# Patient Record
Sex: FEMALE | Race: OTHER | Employment: UNEMPLOYED | URBAN - METROPOLITAN AREA
[De-identification: names, ages, dates, MRNs, and addresses within clinical notes are randomized per-mention and may not be internally consistent; named-entity substitution may affect disease eponyms.]

---

## 2017-06-08 ENCOUNTER — HOSPITAL ENCOUNTER (OUTPATIENT)
Dept: CARDIOLOGY | Age: 47
Discharge: HOME | End: 2017-06-08
Attending: NURSE PRACTITIONER | Admitting: NURSE PRACTITIONER

## 2017-06-10 LAB
EKG ATRIAL RATE: 73 BPM
EKG DIAGNOSIS: NORMAL
EKG P AXIS: 61 DEGREES
EKG P-R INTERVAL: 144 MS
EKG Q-T INTERVAL: 392 MS
EKG QRS DURATION: 90 MS
EKG QTC CALCULATION (BAZETT): 431 MS
EKG R AXIS: 66 DEGREES
EKG T AXIS: 62 DEGREES
EKG VENTRICULAR RATE: 73 BPM

## 2017-06-19 ENCOUNTER — HOSPITAL ENCOUNTER (OUTPATIENT)
Dept: WOMENS IMAGING | Age: 47
Discharge: HOME | End: 2017-06-19
Attending: NURSE PRACTITIONER | Admitting: NURSE PRACTITIONER

## 2017-06-19 DIAGNOSIS — M25.512 LEFT SHOULDER PAIN, UNSPECIFIED CHRONICITY: ICD-10-CM

## 2017-06-19 DIAGNOSIS — Z12.31 VISIT FOR SCREENING MAMMOGRAM: ICD-10-CM

## 2017-10-06 ENCOUNTER — HOSPITAL ENCOUNTER (OUTPATIENT)
Dept: MAMMOGRAPHY | Age: 47
Discharge: HOME | End: 2017-11-04

## 2017-12-18 ENCOUNTER — HOSPITAL ENCOUNTER (EMERGENCY)
Age: 47
Discharge: HOME | End: 2017-12-18
Attending: NURSE PRACTITIONER
Payer: MEDICARE

## 2017-12-18 VITALS
SYSTOLIC BLOOD PRESSURE: 146 MMHG | HEART RATE: 85 BPM | WEIGHT: 150 LBS | TEMPERATURE: 97.8 F | DIASTOLIC BLOOD PRESSURE: 75 MMHG | OXYGEN SATURATION: 98 % | BODY MASS INDEX: 27.6 KG/M2 | RESPIRATION RATE: 16 BRPM | HEIGHT: 62 IN
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
## 2017-12-18 PROCEDURE — 99202 OFFICE O/P NEW SF 15 MIN: CPT | Performed by: NURSE PRACTITIONER

## 2017-12-18 PROCEDURE — 99212 OFFICE O/P EST SF 10 MIN: CPT

## 2017-12-18 RX ORDER — LORATADINE 10 MG/1
10 TABLET ORAL DAILY
Refills: 0 | COMMUNITY
Start: 2017-12-18 | End: 2018-07-23 | Stop reason: ALTCHOICE

## 2017-12-18 NOTE — ED TRIAGE NOTES
To room with c/o rash for one weeks. It started on right ear but she now has on face, neck, and arms.

## 2017-12-19 NOTE — ED PROVIDER NOTES
her father and mother; Diabetes in her father and mother; Heart Disease in her father and mother. SOCIAL HISTORY     Patient  reports that she has never smoked. She has never used smokeless tobacco. She reports that she does not drink alcohol or use drugs. PHYSICAL EXAM     ED TRIAGE VITALS  BP: (!) 146/75, Temp: 97.8 °F (36.6 °C), Pulse: 85, Resp: 16, SpO2: 98 %  Physical Exam   Constitutional: She is oriented to person, place, and time. She appears well-developed and well-nourished. No distress. Cardiovascular: Normal rate, regular rhythm and normal heart sounds. Neurological: She is alert and oriented to person, place, and time. Skin: Skin is warm and dry. Rash noted. Rash is papular. Psychiatric: She has a normal mood and affect. Nursing note and vitals reviewed. DIAGNOSTIC RESULTS   Labs:No results found for this visit on 12/18/17. IMAGING:    URGENT CARE COURSE:     Vitals:    12/18/17 1854   BP: (!) 146/75   Pulse: 85   Resp: 16   Temp: 97.8 °F (36.6 °C)   SpO2: 98%   Weight: 150 lb (68 kg)   Height: 5' 2\" (1.575 m)       Medications - No data to display  PROCEDURES:  None  FINAL IMPRESSION      1. Multiple insect bites        DISPOSITION/PLAN   DISPOSITION Decision to Discharge   Patient presents with multiple insect bites over the lower part of the face neck and upper chest.  She was in advised to apply cool compresses to the area she may take an over-the-counter Claritin during the day her Benadryl at night. I also recommended an anti-itch cream to apply topically. Benadryl makes an anti-itch cream.  I told her to contact her landlord and make sure he has an  come in. She verbalizes understanding.   PATIENT REFERRED TO:  [facility]  [address] [id number]  [phone]    Schedule an appointment as soon as possible for a visit   As needed    DISCHARGE MEDICATIONS:  New Prescriptions    DIPHENHYDRAMINE-ZINC ACETATE (BENADRYL) 1-0.1 % CREAM

## 2018-05-02 LAB
ALBUMIN SERPL-MCNC: 3.9 G/DL
ALP BLD-CCNC: 91 U/L
ALT SERPL-CCNC: 13 U/L
ANION GAP SERPL CALCULATED.3IONS-SCNC: 12 MMOL/L
AST SERPL-CCNC: 18 U/L
BASOPHILS ABSOLUTE: 0 /ΜL
BASOPHILS RELATIVE PERCENT: 0.6 %
BILIRUB SERPL-MCNC: 0.6 MG/DL (ref 0.1–1.4)
BUN BLDV-MCNC: 14 MG/DL
CALCIUM SERPL-MCNC: 8.7 MG/DL
CHLORIDE BLD-SCNC: 100 MMOL/L
CHOLESTEROL, TOTAL: 170 MG/DL
CHOLESTEROL/HDL RATIO: 2.5
CO2: 27 MMOL/L
CREAT SERPL-MCNC: 0.5 MG/DL
EOSINOPHILS ABSOLUTE: 0.2 /ΜL
EOSINOPHILS RELATIVE PERCENT: 5.1 %
GFR CALCULATED: 131.7
GLUCOSE BLD-MCNC: 184 MG/DL
HCT VFR BLD CALC: 34.4 % (ref 36–46)
HDLC SERPL-MCNC: 67 MG/DL (ref 35–70)
HEMOGLOBIN: 10.8 G/DL (ref 12–16)
LDL CHOLESTEROL CALCULATED: 88 MG/DL (ref 0–160)
LYMPHOCYTES ABSOLUTE: 1 /ΜL
LYMPHOCYTES RELATIVE PERCENT: 20.7 %
MCH RBC QN AUTO: 23.5 PG
MCHC RBC AUTO-ENTMCNC: 31.4 G/DL
MCV RBC AUTO: 74.9 FL
MONOCYTES ABSOLUTE: 0.4 /ΜL
MONOCYTES RELATIVE PERCENT: 8 %
NEUTROPHILS ABSOLUTE: 3.1 /ΜL
NEUTROPHILS RELATIVE PERCENT: 65.2 %
PLATELET # BLD: 266 K/ΜL
PMV BLD AUTO: ABNORMAL FL
POTASSIUM SERPL-SCNC: 4.9 MMOL/L
RBC # BLD: 4.59 10^6/ΜL
SODIUM BLD-SCNC: 139 MMOL/L
TOTAL PROTEIN: 6.7
TRIGL SERPL-MCNC: 76 MG/DL
TSH SERPL DL<=0.05 MIU/L-ACNC: 1.07 UIU/ML
VLDLC SERPL CALC-MCNC: 15 MG/DL
WBC # BLD: 4.7 10^3/ML

## 2018-05-07 ENCOUNTER — HOSPITAL ENCOUNTER (EMERGENCY)
Age: 48
Discharge: HOME | End: 2018-05-07
Payer: MEDICARE

## 2018-05-07 VITALS
RESPIRATION RATE: 18 BRPM | OXYGEN SATURATION: 97 % | SYSTOLIC BLOOD PRESSURE: 136 MMHG | TEMPERATURE: 99.4 F | BODY MASS INDEX: 27.6 KG/M2 | HEIGHT: 62 IN | WEIGHT: 150 LBS | DIASTOLIC BLOOD PRESSURE: 79 MMHG | HEART RATE: 93 BPM

## 2018-05-07 DIAGNOSIS — Z20.2 STD EXPOSURE: Primary | ICD-10-CM

## 2018-05-07 LAB
BILIRUBIN URINE: NEGATIVE
BLOOD, URINE: NEGATIVE
CHARACTER, URINE: CLEAR
CHLAMYDIA TRACHOMATIS BY RT-PCR: NOT DETECTED
COLOR: YELLOW
CT/NG SOURCE: NORMAL
GLUCOSE, URINE: NEGATIVE MG/DL
KETONES, URINE: NEGATIVE
LEUKOCYTES, UA: NEGATIVE
NEISSERIA GONORRHOEAE BY RT-PCR: NOT DETECTED
NITRITE, URINE: NEGATIVE
PH UA: 5.5 (ref 5–9)
PROTEIN UA: 30 MG/DL
REFLEX TO URINE C & S: ABNORMAL
SPECIFIC GRAVITY UA: >= 1.03 (ref 1–1.03)
TRICHOMONAS PREP: NEGATIVE
UROBILINOGEN, URINE: 0.2 EU/DL (ref 0–1)

## 2018-05-07 PROCEDURE — 81003 URINALYSIS AUTO W/O SCOPE: CPT

## 2018-05-07 PROCEDURE — 99213 OFFICE O/P EST LOW 20 MIN: CPT | Performed by: NURSE PRACTITIONER

## 2018-05-07 PROCEDURE — 87591 N.GONORRHOEAE DNA AMP PROB: CPT

## 2018-05-07 PROCEDURE — 87205 SMEAR GRAM STAIN: CPT

## 2018-05-07 PROCEDURE — 87070 CULTURE OTHR SPECIMN AEROBIC: CPT

## 2018-05-07 PROCEDURE — 99213 OFFICE O/P EST LOW 20 MIN: CPT

## 2018-05-07 PROCEDURE — 87808 TRICHOMONAS ASSAY W/OPTIC: CPT

## 2018-05-07 PROCEDURE — 87491 CHLMYD TRACH DNA AMP PROBE: CPT

## 2018-05-07 ASSESSMENT — ENCOUNTER SYMPTOMS: ABDOMINAL PAIN: 0

## 2018-05-10 LAB
GENITAL CULTURE, ROUTINE: NORMAL
GRAM STAIN RESULT: NORMAL

## 2018-07-23 ENCOUNTER — OFFICE VISIT (OUTPATIENT)
Dept: INTERNAL MEDICINE CLINIC | Age: 48
End: 2018-07-23
Payer: MEDICARE

## 2018-07-23 VITALS — WEIGHT: 293 LBS | BODY MASS INDEX: 272.09 KG/M2

## 2018-07-23 DIAGNOSIS — E66.01 MORBID OBESITY (HCC): ICD-10-CM

## 2018-07-23 DIAGNOSIS — E11.9 TYPE 2 DIABETES MELLITUS WITHOUT COMPLICATION, WITH LONG-TERM CURRENT USE OF INSULIN (HCC): Primary | ICD-10-CM

## 2018-07-23 DIAGNOSIS — Z79.4 TYPE 2 DIABETES MELLITUS WITHOUT COMPLICATION, WITH LONG-TERM CURRENT USE OF INSULIN (HCC): Primary | ICD-10-CM

## 2018-07-23 LAB — HBA1C MFR BLD: 7.1 % (ref 4.3–5.7)

## 2018-07-23 PROCEDURE — 3045F PR MOST RECENT HEMOGLOBIN A1C LEVEL 7.0-9.0%: CPT | Performed by: INTERNAL MEDICINE

## 2018-07-23 PROCEDURE — G8417 CALC BMI ABV UP PARAM F/U: HCPCS | Performed by: INTERNAL MEDICINE

## 2018-07-23 PROCEDURE — 2022F DILAT RTA XM EVC RTNOPTHY: CPT | Performed by: INTERNAL MEDICINE

## 2018-07-23 PROCEDURE — 99204 OFFICE O/P NEW MOD 45 MIN: CPT | Performed by: INTERNAL MEDICINE

## 2018-07-23 PROCEDURE — G8427 DOCREV CUR MEDS BY ELIG CLIN: HCPCS | Performed by: INTERNAL MEDICINE

## 2018-07-23 PROCEDURE — 1036F TOBACCO NON-USER: CPT | Performed by: INTERNAL MEDICINE

## 2018-07-23 PROCEDURE — 83036 HEMOGLOBIN GLYCOSYLATED A1C: CPT | Performed by: INTERNAL MEDICINE

## 2018-07-23 NOTE — PROGRESS NOTES
[facility] PROFESSIONAL SERVS  PHYSICIANS [place] [address] [address]  Dept: [phone]  Dept Fax: [phone]      Chief Complaint   Patient presents with   Kiran Heaton Doctor     referred by [facility] - DM        Patient presents for evaluation of diabetes. I have never seen the patient before. This patient is followed regularly by Dr. Juan Carlos Johnson  Patient has been diabetic for over 20 years but has only been taking it seriously for the last 3  She says sugars are running a lot better now. She was able to lower her insulin dose. She is currently on Levemir and metformin  She has no complications from her diabetes that she knows of  Past Medical History:   Diagnosis Date    CAD (coronary artery disease)     Depression     Diabetes mellitus (HCC)     Headache     Hyperlipidemia        Current Outpatient Prescriptions   Medication Sig Dispense Refill    Calcium Citrate-Vitamin D (CALCIUM + D PO) Take by mouth daily      insulin detemir (LEVEMIR) 100 UNIT/ML injection vial Inject 18 Units into the skin nightly       metFORMIN (GLUCOPHAGE) 500 MG tablet Take 500 mg by mouth 2 times daily (with meals)       aspirin 81 MG chewable tablet Take 81 mg by mouth daily      Multiple Vitamins-Minerals (THERAPEUTIC MULTIVITAMIN-MINERALS) tablet Take 1 tablet by mouth daily      ATORVASTATIN CALCIUM PO Take 40 mg by mouth daily        No current facility-administered medications for this visit. Past Surgical History:   Procedure Laterality Date    CORONARY ANGIOPLASTY WITH STENT PLACEMENT      TUBAL LIGATION         Allergies   Allergen Reactions    Keflex [Cephalexin] Shortness Of Breath       Social History     Social History    Marital status:      Spouse name: N/A    Number of children: N/A    Years of education: N/A     Occupational History    Not on file.      Social History Main Topics    Smoking status: Never Smoker    Smokeless tobacco: Never Used    Alcohol use No    Drug use: No    Sexual activity: Not on file     Other Topics Concern    Not on file     Social History Narrative    No narrative on file       Family History   Problem Relation Age of Onset    Diabetes Mother     Heart Disease Mother     Cancer Mother     Diabetes Father     Heart Disease Father     Cancer Father        Review of Systems - General ROS: negative  Psychological ROS: negative  Hematological and Lymphatic ROS: negative  Respiratory ROS: no cough, shortness of breath, or wheezing  Cardiovascular ROS: no chest pain or dyspnea on exertion  Gastrointestinal ROS: no abdominal pain, change in bowel habits, or black or bloody stools  Genito-Urinary ROS: no dysuria, trouble voiding, or hematuria  Musculoskeletal ROS: negative  Neurological ROS: no TIA or stroke symptoms  Dermatological ROS: negative    There were no vitals taken for this visit. Physical Examination: General appearance - alert, well appearing, and in no distress  Mental status - alert, oriented to person, place, and time  Neck - supple, no significant adenopathy  Chest - clear to auscultation, no wheezes, rales or rhonchi, symmetric air entry  Heart - normal rate, regular rhythm, normal S1, S2, no murmurs, rubs, clicks or gallops  Abdomen - soft, nontender, nondistended, no masses or organomegaly  Neurological - alert, oriented, normal speech, no focal findings or movement disorder noted  Musculoskeletal - no joint tenderness, deformity or swelling  Extremities - peripheral pulses normal, no pedal edema, no clubbing or cyanosis  Skin - normal coloration and turgor, no rashes, no suspicious skin lesions noted        Diagnosis Orders   1.  Type 2 diabetes mellitus without complication, with long-term current use of insulin ([facility])  POCT glycosylated hemoglobin (Hb A1C)    71615 - Collection Capillary Blood Specimen       Orders Placed This Encounter   Procedures    POCT glycosylated hemoglobin

## 2018-07-24 RX ORDER — LANCETS 30 GAUGE
EACH MISCELLANEOUS
Qty: 100 EACH | Refills: 3 | Status: SHIPPED | OUTPATIENT
Start: 2018-07-24 | End: 2020-09-24 | Stop reason: SDUPTHER

## 2018-08-08 ENCOUNTER — HOSPITAL ENCOUNTER (EMERGENCY)
Age: 48
Discharge: HOME | End: 2018-08-08
Payer: MEDICARE

## 2018-08-08 VITALS
SYSTOLIC BLOOD PRESSURE: 131 MMHG | DIASTOLIC BLOOD PRESSURE: 68 MMHG | HEART RATE: 89 BPM | RESPIRATION RATE: 18 BRPM | TEMPERATURE: 97.8 F | OXYGEN SATURATION: 97 %

## 2018-08-08 DIAGNOSIS — N89.8 VAGINAL ITCHING: Primary | ICD-10-CM

## 2018-08-08 PROCEDURE — 99213 OFFICE O/P EST LOW 20 MIN: CPT

## 2018-08-08 PROCEDURE — 99213 OFFICE O/P EST LOW 20 MIN: CPT | Performed by: NURSE PRACTITIONER

## 2018-08-08 RX ORDER — CLOTRIMAZOLE AND BETAMETHASONE DIPROPIONATE 10; .64 MG/G; MG/G
CREAM TOPICAL
Qty: 45 G | Refills: 1 | Status: SHIPPED | OUTPATIENT
Start: 2018-08-08 | End: 2018-08-22

## 2018-08-08 ASSESSMENT — PAIN DESCRIPTION - DESCRIPTORS: DESCRIPTORS: ITCHING

## 2018-08-08 ASSESSMENT — ENCOUNTER SYMPTOMS
COLOR CHANGE: 0
SHORTNESS OF BREATH: 0

## 2018-08-08 ASSESSMENT — PAIN DESCRIPTION - FREQUENCY: FREQUENCY: CONTINUOUS

## 2018-08-08 ASSESSMENT — PAIN DESCRIPTION - PAIN TYPE: TYPE: ACUTE PAIN

## 2018-08-08 ASSESSMENT — PAIN SCALES - GENERAL: PAINLEVEL_OUTOF10: 10

## 2018-08-08 ASSESSMENT — PAIN DESCRIPTION - LOCATION: LOCATION: VAGINA

## 2018-08-08 NOTE — ED PROVIDER NOTES
08/08/2018 07:51:31 PM    Patient's physical exam is consistent with vaginal itching. The vaginal exam was performed with a chaperone Radha RN. Patient tolerated the procedure well. She was started on Lotrisone. She was given a referral to a gynecologist.  The patient is to follow-up with a gynecologist as scheduled per [facility] ([facility]). The patient was agreeable to the plan of care and voiced no concerns at time of discharge. The patient was ambulatory out of the department stable condition. PATIENT REFERRED TO:  MIRIAN Dewitt NP    DISCHARGE MEDICATIONS:  New Prescriptions    CLOTRIMAZOLE-BETAMETHASONE (LOTRISONE) 1-0.05 % CREAM    Apply topically 2 times daily.        Discontinued Medications    No medications on file       Current Discharge Medication List          MIRIAN Serna CNP    (Please note that portions of this note were completed with a voice recognition program.  Efforts were made to edit the dictations but occasionally words are mis-transcribed.)           MIRIAN Serna CNP  08/08/18 2016

## 2018-08-31 ENCOUNTER — APPOINTMENT (OUTPATIENT)
Dept: GENERAL RADIOLOGY | Age: 48
End: 2018-08-31
Payer: MEDICARE

## 2018-08-31 ENCOUNTER — HOSPITAL ENCOUNTER (EMERGENCY)
Age: 48
Discharge: HOME | End: 2018-09-01
Payer: MEDICARE

## 2018-08-31 VITALS
WEIGHT: 155 LBS | DIASTOLIC BLOOD PRESSURE: 73 MMHG | OXYGEN SATURATION: 98 % | RESPIRATION RATE: 17 BRPM | BODY MASS INDEX: 28.52 KG/M2 | TEMPERATURE: 98.1 F | HEIGHT: 62 IN | HEART RATE: 74 BPM | SYSTOLIC BLOOD PRESSURE: 134 MMHG

## 2018-08-31 DIAGNOSIS — S46.912A STRAIN OF LEFT SHOULDER, INITIAL ENCOUNTER: Primary | ICD-10-CM

## 2018-08-31 LAB
ALBUMIN SERPL-MCNC: 4.3 G/DL (ref 3.5–5.1)
ALP BLD-CCNC: 79 U/L (ref 38–126)
ALT SERPL-CCNC: 12 U/L (ref 11–66)
ANION GAP SERPL CALCULATED.3IONS-SCNC: 12 MEQ/L (ref 8–16)
AST SERPL-CCNC: 19 U/L (ref 5–40)
BASOPHILS # BLD: 0.4 %
BASOPHILS ABSOLUTE: 0 THOU/MM3 (ref 0–0.1)
BILIRUB SERPL-MCNC: 0.6 MG/DL (ref 0.3–1.2)
BILIRUBIN DIRECT: < 0.2 MG/DL (ref 0–0.3)
BUN BLDV-MCNC: 11 MG/DL (ref 7–22)
CALCIUM SERPL-MCNC: 9.1 MG/DL (ref 8.5–10.5)
CHLORIDE BLD-SCNC: 102 MEQ/L (ref 98–111)
CO2: 24 MEQ/L (ref 23–33)
CREAT SERPL-MCNC: 0.4 MG/DL (ref 0.4–1.2)
EKG ATRIAL RATE: 77 BPM
EKG P AXIS: 70 DEGREES
EKG P-R INTERVAL: 178 MS
EKG Q-T INTERVAL: 392 MS
EKG QRS DURATION: 96 MS
EKG QTC CALCULATION (BAZETT): 443 MS
EKG R AXIS: 67 DEGREES
EKG T AXIS: 46 DEGREES
EKG VENTRICULAR RATE: 77 BPM
EOSINOPHIL # BLD: 6 %
EOSINOPHILS ABSOLUTE: 0.4 THOU/MM3 (ref 0–0.4)
ERYTHROCYTE [DISTWIDTH] IN BLOOD BY AUTOMATED COUNT: 14 % (ref 11.5–14.5)
ERYTHROCYTE [DISTWIDTH] IN BLOOD BY AUTOMATED COUNT: 40.2 FL (ref 35–45)
GFR SERPL CREATININE-BSD FRML MDRD: > 90 ML/MIN/1.73M2
GLUCOSE BLD-MCNC: 139 MG/DL (ref 70–108)
HCT VFR BLD CALC: 34.2 % (ref 37–47)
HEMOGLOBIN: 10.8 GM/DL (ref 12–16)
IMMATURE GRANS (ABS): 0.02 THOU/MM3 (ref 0–0.07)
IMMATURE GRANULOCYTES: 0.3 %
LIPASE: 15.3 U/L (ref 5.6–51.3)
LYMPHOCYTES # BLD: 28.4 %
LYMPHOCYTES ABSOLUTE: 2.1 THOU/MM3 (ref 1–4.8)
MAGNESIUM: 1.7 MG/DL (ref 1.6–2.4)
MCH RBC QN AUTO: 25.1 PG (ref 26–33)
MCHC RBC AUTO-ENTMCNC: 31.6 GM/DL (ref 32.2–35.5)
MCV RBC AUTO: 79.5 FL (ref 81–99)
MONOCYTES # BLD: 7.6 %
MONOCYTES ABSOLUTE: 0.6 THOU/MM3 (ref 0.4–1.3)
NUCLEATED RED BLOOD CELLS: 0 /100 WBC
OSMOLALITY CALCULATION: 277.3 MOSMOL/KG (ref 275–300)
PLATELET # BLD: 236 THOU/MM3 (ref 130–400)
PMV BLD AUTO: 11.3 FL (ref 9.4–12.4)
POTASSIUM SERPL-SCNC: 3.9 MEQ/L (ref 3.5–5.2)
RBC # BLD: 4.3 MILL/MM3 (ref 4.2–5.4)
SEG NEUTROPHILS: 57.3 %
SEGMENTED NEUTROPHILS ABSOLUTE COUNT: 4.2 THOU/MM3 (ref 1.8–7.7)
SODIUM BLD-SCNC: 138 MEQ/L (ref 135–145)
TOTAL PROTEIN: 7.9 G/DL (ref 6.1–8)
TROPONIN T: < 0.01 NG/ML
WBC # BLD: 7.4 THOU/MM3 (ref 4.8–10.8)

## 2018-08-31 PROCEDURE — 6370000000 HC RX 637 (ALT 250 FOR IP): Performed by: NURSE PRACTITIONER

## 2018-08-31 PROCEDURE — 83735 ASSAY OF MAGNESIUM: CPT

## 2018-08-31 PROCEDURE — 71046 X-RAY EXAM CHEST 2 VIEWS: CPT

## 2018-08-31 PROCEDURE — 83690 ASSAY OF LIPASE: CPT

## 2018-08-31 PROCEDURE — 99283 EMERGENCY DEPT VISIT LOW MDM: CPT

## 2018-08-31 PROCEDURE — 85025 COMPLETE CBC W/AUTO DIFF WBC: CPT

## 2018-08-31 PROCEDURE — 36415 COLL VENOUS BLD VENIPUNCTURE: CPT

## 2018-08-31 PROCEDURE — 73030 X-RAY EXAM OF SHOULDER: CPT

## 2018-08-31 PROCEDURE — 80053 COMPREHEN METABOLIC PANEL: CPT

## 2018-08-31 PROCEDURE — 82248 BILIRUBIN DIRECT: CPT

## 2018-08-31 PROCEDURE — 84484 ASSAY OF TROPONIN QUANT: CPT

## 2018-08-31 PROCEDURE — 93005 ELECTROCARDIOGRAM TRACING: CPT | Performed by: NURSE PRACTITIONER

## 2018-08-31 PROCEDURE — 73060 X-RAY EXAM OF HUMERUS: CPT

## 2018-08-31 RX ORDER — ORPHENADRINE CITRATE 100 MG/1
100 TABLET, EXTENDED RELEASE ORAL ONCE
Status: COMPLETED | OUTPATIENT
Start: 2018-08-31 | End: 2018-08-31

## 2018-08-31 RX ORDER — NAPROXEN 250 MG/1
500 TABLET ORAL ONCE
Status: COMPLETED | OUTPATIENT
Start: 2018-08-31 | End: 2018-08-31

## 2018-08-31 RX ADMIN — ORPHENADRINE CITRATE 100 MG: 100 TABLET, EXTENDED RELEASE ORAL at 21:56

## 2018-08-31 RX ADMIN — NAPROXEN 500 MG: 250 TABLET ORAL at 21:56

## 2018-08-31 ASSESSMENT — ENCOUNTER SYMPTOMS
EYE DISCHARGE: 0
SORE THROAT: 0
WHEEZING: 0
EYE PAIN: 0
ABDOMINAL PAIN: 0
DIARRHEA: 0
COUGH: 0
BACK PAIN: 0
VOMITING: 0
NAUSEA: 0
SHORTNESS OF BREATH: 0
RHINORRHEA: 0

## 2018-08-31 ASSESSMENT — PAIN SCALES - GENERAL
PAINLEVEL_OUTOF10: 8
PAINLEVEL_OUTOF10: 7

## 2018-08-31 ASSESSMENT — PAIN DESCRIPTION - PAIN TYPE: TYPE: ACUTE PAIN

## 2018-08-31 ASSESSMENT — PAIN DESCRIPTION - DESCRIPTORS: DESCRIPTORS: ACHING

## 2018-08-31 ASSESSMENT — PAIN DESCRIPTION - LOCATION: LOCATION: ARM

## 2018-08-31 ASSESSMENT — PAIN DESCRIPTION - ORIENTATION: ORIENTATION: LEFT

## 2018-09-01 RX ORDER — ORPHENADRINE CITRATE 100 MG/1
100 TABLET, EXTENDED RELEASE ORAL 2 TIMES DAILY
Qty: 20 TABLET | Refills: 0 | Status: SHIPPED | OUTPATIENT
Start: 2018-09-01 | End: 2018-09-11

## 2018-09-01 RX ORDER — NAPROXEN 500 MG/1
500 TABLET ORAL 2 TIMES DAILY
Qty: 60 TABLET | Refills: 0 | Status: SHIPPED | OUTPATIENT
Start: 2018-09-01 | End: 2019-08-21

## 2018-09-01 NOTE — ED PROVIDER NOTES
[facility]  eMERGENCY dEPARTMENT eNCOUnter          CHIEF COMPLAINT       Chief Complaint   Patient presents with    Arm Pain     left       Nurses Notes reviewed and I agree except as noted in the HPI. HISTORY OF PRESENT ILLNESS    Ayush Patel is a 50 y.o. female who presents to the Emergency Department for the evaluation of arm pain. Patient states that she has been having left arm pain for the past week. Patient states that initially her pain started in left side of her neck, and states that her pain radiates down her left arm. She states that this pain is worse while laying in bed or when she wakes from sleep. She states that she works in [address] and must occasionally lift moderately heavy object quickly. She states that she does repetitive movements 12 hours daily for 5 days at a time. She states that she had exact same pain in her right arm a couple weeks ago. She has used tylenol, and over the counter \"patches\" without much relief. She is diabetic on insulin, and states that she is on daily aspirin. The HPI was provided by the patient. REVIEW OF SYSTEMS     Review of Systems   Constitutional: Negative for appetite change, chills, fatigue and fever. HENT: Negative for congestion, ear pain, rhinorrhea and sore throat. Eyes: Negative for pain, discharge and visual disturbance. Respiratory: Negative for cough, shortness of breath and wheezing. Cardiovascular: Negative for chest pain, palpitations and leg swelling. Gastrointestinal: Negative for abdominal pain, diarrhea, nausea and vomiting. Genitourinary: Negative for difficulty urinating, dysuria, hematuria and vaginal discharge. Musculoskeletal: Positive for arthralgias. Negative for back pain, joint swelling and neck pain. Skin: Negative for pallor and rash. Neurological: Negative for dizziness, syncope, weakness, light-headedness, numbness and headaches. Hematological: Negative for adenopathy. 74. Her respiration is 17 and oxygen saturation is 98%. Physical Exam   Constitutional: She is oriented to person, place, and time. Vital signs are normal. She appears well-developed and well-nourished. Non-toxic appearance. No distress. HENT:   Head: Normocephalic and atraumatic. Right Ear: Hearing normal.   Left Ear: Hearing normal.   Nose: Nose normal. No epistaxis. Mouth/Throat: Oropharynx is clear and moist.   Eyes: Conjunctivae, EOM and lids are normal.   Neck: No neck rigidity. No tracheal deviation present. Cardiovascular: Normal rate and regular rhythm. Pulses:       Radial pulses are 2+ on the right side, and 2+ on the left side. Pulmonary/Chest: Effort normal. No stridor. No tachypnea. No respiratory distress. Abdominal: Soft. She exhibits no distension. Musculoskeletal:        Left shoulder: She exhibits tenderness (posterior). She exhibits normal range of motion, no bony tenderness, no swelling, no effusion, no crepitus, no deformity, no laceration, no spasm and normal strength. Cervical back: She exhibits normal range of motion, no tenderness, no bony tenderness, no swelling, no edema, no deformity, no laceration and no spasm. Left clavicle tenderness. Neurological: She is alert and oriented to person, place, and time. She has normal strength. No sensory deficit. Gait normal. GCS eye subscore is 4. GCS verbal subscore is 5. GCS motor subscore is 6. Skin: Skin is warm, dry and intact. No abrasion, no ecchymosis and no rash noted. She is not diaphoretic. No pallor. Psychiatric: She has a normal mood and affect. Her speech is normal and behavior is normal. Thought content normal. Cognition and memory are normal.   Nursing note and vitals reviewed.     DIFFERENTIAL DIAGNOSIS:   Strain, sprain, overuse injury, rotator cuff injury    DIAGNOSTIC RESULTS     EKG: All EKG's are interpreted by the Emergency Department Physician who either signs or Co-signs this chart in the absence of a cardiologist.  None    RADIOLOGY: non-plain film images(s) such as CT, Ultrasound and MRI are read by the radiologist.  XR CHEST STANDARD (2 VW)   Final Result   Stable radiographic appearance of the chest. No evidence of an acute process. **This report has been created using voice recognition software. It may contain minor errors which are inherent in voice recognition technology. **      Final report electronically signed by Dr. Singh Samuel on 8/31/2018 11:33 PM      XR SHOULDER LEFT (MIN 2 VIEWS)   Final Result   There is no acute fracture or dislocation. There are faint calcifications adjacent to the greater tuberosity of the humeral head which could represent rotator cuff calcifications. Consider MRI for further evaluation if clinically indicated. **This report has been created using voice recognition software. It may contain minor errors which are inherent in voice recognition technology. **      Final report electronically signed by Dr. Benjamín Jensen on 8/31/2018 9:04 PM      XR HUMERUS LEFT (MIN 2 VIEWS)   Final Result   There is no acute fracture or dislocation. There are faint calcifications adjacent to the greater tuberosity of the humeral head which could represent rotator cuff calcifications. Consider MRI for further evaluation if clinically indicated. **This report has been created using voice recognition software. It may contain minor errors which are inherent in voice recognition technology. **      Final report electronically signed by Dr. Benjamín Jensen on 8/31/2018 9:04 PM            LABS:     Labs Reviewed   BASIC METABOLIC PANEL - Abnormal; Notable for the following:        Result Value    Glucose 139 (*)     All other components within normal limits   CBC WITH AUTO DIFFERENTIAL - Abnormal; Notable for the following:     Hemoglobin 10.8 (*)     Hematocrit 34.2 (*)     MCV 79.5 (*)     MCH 25.1 (*)     MCHC 31.6 (*)     All other components within normal limits   HEPATIC FUNCTION PANEL   LIPASE   MAGNESIUM   TROPONIN   ANION GAP   GLOMERULAR FILTRATION RATE, ESTIMATED   OSMOLALITY       EMERGENCY DEPARTMENT COURSE:   Vitals:    Vitals:    08/31/18 1916 08/31/18 1918   BP: 134/73    Pulse: 74    Resp: 17    Temp:  98.1 °F (36.7 °C)   TempSrc: Oral    SpO2: 98%    Weight: 155 lb (70.3 kg)    Height: 5' 2\" (1.575 m)        9:39 PM: The patient was seen and evaluated. Pertinent Labs & Imaging studies reviewed. (See chart for details)    The patient was seen and evaluated within the ED today with left shoulder pain. Within the department, I observed the patient's vital signs to be within acceptable range. On exam, I appreciated findings as documented in the physical exam.  Radiological studies within the department revealed no acute findings however it did reveal some calcifications in the rotator cuff. Laboratory work was reviewed. Within the department, the patient was treated with Toradol and Norflex the patient reported little relief with these medications she continued to have pain that extended down into the chest and down the back. I then ordered a cardiac workup as the patient does have a cardiac history. The cardiac workup was also negative. .  I observed the patient's condition to remain stable during the duration of the stay and I explained my proposed course of treatment to the patient, who was amenable to my decision. They were discharged home in stable condition with instructions to follow-up with her PCP or [place], and the patient will return to the ED if the symptoms become more severe in nature or otherwise change    I have given the patient strict written and verbal instructions about care at home, follow-up, and signs and symptoms of worsening of condition and they did verbalize understanding.        Medications   naproxen (NAPROSYN) tablet 500 mg (500 mg Oral Given 8/31/18 2156)   orphenadrine (NORFLEX) extended release tablet 100 mg (100 mg Oral Given 8/31/18 6332)       CRITICAL CARE:   none     CONSULTS:   none    PROCEDURES:  None    FINAL IMPRESSION      1. Strain of left shoulder, initial encounter          DISPOSITION/PLAN     discharge    PATIENT REFERRED TO:  MIRIAN Longoria NP  [address] [address]  [phone]            DISCHARGE MEDICATIONS:  Discharge Medication List as of 9/1/2018 12:51 AM      START taking these medications    Details   naproxen (NAPROSYN) 500 MG tablet Take 1 tablet by mouth 2 times daily, Disp-60 tablet, R-0Print      orphenadrine (NORFLEX) 100 MG extended release tablet Take 1 tablet by mouth 2 times daily for 10 days, Disp-20 tablet, R-0Print             (Please note that portions of this note were completed with a voice recognition program.  Efforts were made to edit the dictations but occasionally words are mis-transcribed.)      Scribe: This document serves as a record of the services and decisions personally performed and made by Sergio Stevens CNP. It was created on their behalf by Alfred Fulton, a trained medical scribe. The creation of this document is based the provider's statements to the medical scribe. Signed by: Sharee Morales, 09/01/18 6:38 PM    Provider: The information in this document, created by the medical scribe for me, accurately reflects the services I personally performed and the decisions made by me.     Sergio Stevens CNP 6:38 PM          MIRIAN Marquez CNP  09/01/18 5318

## 2018-09-02 PROCEDURE — 93010 ELECTROCARDIOGRAM REPORT: CPT | Performed by: INTERNAL MEDICINE

## 2018-10-19 ENCOUNTER — TELEPHONE (OUTPATIENT)
Dept: INTERNAL MEDICINE CLINIC | Age: 48
End: 2018-10-19

## 2018-12-03 ENCOUNTER — OFFICE VISIT (OUTPATIENT)
Dept: INTERNAL MEDICINE CLINIC | Age: 48
End: 2018-12-03
Payer: MEDICARE

## 2018-12-03 VITALS
BODY MASS INDEX: 28.97 KG/M2 | HEART RATE: 78 BPM | RESPIRATION RATE: 14 BRPM | SYSTOLIC BLOOD PRESSURE: 122 MMHG | WEIGHT: 157.4 LBS | DIASTOLIC BLOOD PRESSURE: 80 MMHG | HEIGHT: 62 IN

## 2018-12-03 DIAGNOSIS — E11.9 TYPE 2 DIABETES MELLITUS WITHOUT COMPLICATION, WITH LONG-TERM CURRENT USE OF INSULIN (HCC): Primary | ICD-10-CM

## 2018-12-03 DIAGNOSIS — Z79.4 TYPE 2 DIABETES MELLITUS WITHOUT COMPLICATION, WITH LONG-TERM CURRENT USE OF INSULIN (HCC): Primary | ICD-10-CM

## 2018-12-03 LAB — HBA1C MFR BLD: 7 % (ref 4.3–5.7)

## 2018-12-03 PROCEDURE — 99213 OFFICE O/P EST LOW 20 MIN: CPT | Performed by: INTERNAL MEDICINE

## 2018-12-03 PROCEDURE — G8417 CALC BMI ABV UP PARAM F/U: HCPCS | Performed by: INTERNAL MEDICINE

## 2018-12-03 PROCEDURE — G8484 FLU IMMUNIZE NO ADMIN: HCPCS | Performed by: INTERNAL MEDICINE

## 2018-12-03 PROCEDURE — 1036F TOBACCO NON-USER: CPT | Performed by: INTERNAL MEDICINE

## 2018-12-03 PROCEDURE — G8427 DOCREV CUR MEDS BY ELIG CLIN: HCPCS | Performed by: INTERNAL MEDICINE

## 2018-12-03 PROCEDURE — 2022F DILAT RTA XM EVC RTNOPTHY: CPT | Performed by: INTERNAL MEDICINE

## 2018-12-03 PROCEDURE — 83036 HEMOGLOBIN GLYCOSYLATED A1C: CPT | Performed by: INTERNAL MEDICINE

## 2018-12-03 PROCEDURE — 3045F PR MOST RECENT HEMOGLOBIN A1C LEVEL 7.0-9.0%: CPT | Performed by: INTERNAL MEDICINE

## 2018-12-03 PROCEDURE — 3017F COLORECTAL CA SCREEN DOC REV: CPT | Performed by: INTERNAL MEDICINE

## 2018-12-03 ASSESSMENT — PATIENT HEALTH QUESTIONNAIRE - PHQ9
SUM OF ALL RESPONSES TO PHQ QUESTIONS 1-9: 0
SUM OF ALL RESPONSES TO PHQ QUESTIONS 1-9: 0
SUM OF ALL RESPONSES TO PHQ9 QUESTIONS 1 & 2: 0
1. LITTLE INTEREST OR PLEASURE IN DOING THINGS: 0
2. FEELING DOWN, DEPRESSED OR HOPELESS: 0

## 2018-12-03 NOTE — PROGRESS NOTES
no abdominal pain, change in bowel habits, or black or bloody stools  Genito-Urinary ROS: no dysuria, trouble voiding, or hematuria  Musculoskeletal ROS: negative  Neurological ROS: no TIA or stroke symptoms  Dermatological ROS: negative    Blood pressure 122/80, pulse 78, resp. rate 14, height 5' 2.01\" (1.575 m), weight 157 lb 6.4 oz (71.4 kg). Physical Examination: General appearance - alert, well appearing, and in no distress  Mental status - alert, oriented to person, place, and time  Neck - supple, no significant adenopathy  Chest - clear to auscultation, no wheezes, rales or rhonchi, symmetric air entry  Heart - normal rate, regular rhythm, normal S1, S2, no murmurs, rubs, clicks or gallops  Abdomen - soft, nontender, nondistended, no masses or organomegaly  Neurological - alert, oriented, normal speech, no focal findings or movement disorder noted  Musculoskeletal - no joint tenderness, deformity or swelling  Extremities - peripheral pulses normal, no pedal edema, no clubbing or cyanosis  Skin - normal coloration and turgor, no rashes, no suspicious skin lesions noted        Diagnosis Orders   1. Type 2 diabetes mellitus without complication, with long-term current use of insulin ([facility])  POCT glycosylated hemoglobin (Hb A1C)    63730 - Collection Capillary Blood Specimen       Orders Placed This Encounter   Procedures    POCT glycosylated hemoglobin (Hb A1C)    89269 - Collection Capillary Blood Specimen     Extensive counseling was done regarding diabetes.  The goals are to decrease or prevent the complications of diabetes and improve survival. The following goals were discussed  HgbA1c < 7 (providing no hypoglycemia)    BMI  18-25    BP < 130/80    STATIN(medium or high risk)    DIET <20% protein  < 30% fat, no trans fats, calorie restricted if BMI high    URINE MICROALBUMIN/CREATINIE  < 30     DILATED EYE EXAM EVERY 1-2 YEARS    MONITOR FEET FOR SORES, NEUROPATHY, ETC    REGULAR DENTAL CARE    Hemoglobin A1c is 7.0        I think the patient is doing well. Continue current plan.   Follow-up 6 months

## 2019-03-13 ENCOUNTER — HOSPITAL ENCOUNTER (OUTPATIENT)
Age: 49
Discharge: HOME | End: 2019-03-13
Payer: MEDICARE

## 2019-03-13 LAB
BASOPHILS # BLD: 0.3 %
BASOPHILS ABSOLUTE: 0 THOU/MM3 (ref 0–0.1)
EOSINOPHIL # BLD: 5.6 %
EOSINOPHILS ABSOLUTE: 0.3 THOU/MM3 (ref 0–0.4)
ERYTHROCYTE [DISTWIDTH] IN BLOOD BY AUTOMATED COUNT: 14.1 % (ref 11.5–14.5)
ERYTHROCYTE [DISTWIDTH] IN BLOOD BY AUTOMATED COUNT: 41 FL (ref 35–45)
FOLLICLE STIMULATING HORMONE: 21.2 MIU/ML (ref 16–160)
HCT VFR BLD CALC: 34.3 % (ref 37–47)
HEMOGLOBIN: 10.6 GM/DL (ref 12–16)
IMMATURE GRANS (ABS): 0.01 THOU/MM3 (ref 0–0.07)
IMMATURE GRANULOCYTES: 0.2 %
LUTEINIZING HORMONE: 10.9 MIU/ML (ref 3.3–70.6)
LYMPHOCYTES # BLD: 29 %
LYMPHOCYTES ABSOLUTE: 1.8 THOU/MM3 (ref 1–4.8)
MCH RBC QN AUTO: 24.6 PG (ref 26–33)
MCHC RBC AUTO-ENTMCNC: 30.9 GM/DL (ref 32.2–35.5)
MCV RBC AUTO: 79.6 FL (ref 81–99)
MONOCYTES # BLD: 8.2 %
MONOCYTES ABSOLUTE: 0.5 THOU/MM3 (ref 0.4–1.3)
NUCLEATED RED BLOOD CELLS: 0 /100 WBC
PLATELET # BLD: 255 THOU/MM3 (ref 130–400)
PMV BLD AUTO: 11.6 FL (ref 9.4–12.4)
RBC # BLD: 4.31 MILL/MM3 (ref 4.2–5.4)
SEG NEUTROPHILS: 56.7 %
SEGMENTED NEUTROPHILS ABSOLUTE COUNT: 3.5 THOU/MM3 (ref 1.8–7.7)
TSH SERPL DL<=0.05 MIU/L-ACNC: 2.68 UIU/ML (ref 0.4–4.2)
WBC # BLD: 6.1 THOU/MM3 (ref 4.8–10.8)

## 2019-03-13 PROCEDURE — 84443 ASSAY THYROID STIM HORMONE: CPT

## 2019-03-13 PROCEDURE — 83002 ASSAY OF GONADOTROPIN (LH): CPT

## 2019-03-13 PROCEDURE — 36415 COLL VENOUS BLD VENIPUNCTURE: CPT

## 2019-03-13 PROCEDURE — 83001 ASSAY OF GONADOTROPIN (FSH): CPT

## 2019-03-13 PROCEDURE — 85025 COMPLETE CBC W/AUTO DIFF WBC: CPT

## 2019-07-03 ENCOUNTER — OFFICE VISIT (OUTPATIENT)
Dept: INTERNAL MEDICINE CLINIC | Age: 49
End: 2019-07-03
Payer: MEDICARE

## 2019-07-03 VITALS
HEIGHT: 62 IN | DIASTOLIC BLOOD PRESSURE: 63 MMHG | WEIGHT: 153.5 LBS | HEART RATE: 85 BPM | SYSTOLIC BLOOD PRESSURE: 122 MMHG | BODY MASS INDEX: 28.25 KG/M2 | RESPIRATION RATE: 12 BRPM

## 2019-07-03 DIAGNOSIS — E11.9 TYPE 2 DIABETES MELLITUS WITHOUT COMPLICATION, WITH LONG-TERM CURRENT USE OF INSULIN (HCC): Primary | ICD-10-CM

## 2019-07-03 DIAGNOSIS — Z79.4 TYPE 2 DIABETES MELLITUS WITHOUT COMPLICATION, WITH LONG-TERM CURRENT USE OF INSULIN (HCC): Primary | ICD-10-CM

## 2019-07-03 LAB — HBA1C MFR BLD: 7.2 % (ref 4.3–5.7)

## 2019-07-03 PROCEDURE — 83036 HEMOGLOBIN GLYCOSYLATED A1C: CPT | Performed by: NURSE PRACTITIONER

## 2019-07-03 PROCEDURE — G8427 DOCREV CUR MEDS BY ELIG CLIN: HCPCS | Performed by: NURSE PRACTITIONER

## 2019-07-03 PROCEDURE — 99214 OFFICE O/P EST MOD 30 MIN: CPT | Performed by: NURSE PRACTITIONER

## 2019-07-03 PROCEDURE — G8417 CALC BMI ABV UP PARAM F/U: HCPCS | Performed by: NURSE PRACTITIONER

## 2019-07-03 PROCEDURE — 3017F COLORECTAL CA SCREEN DOC REV: CPT | Performed by: NURSE PRACTITIONER

## 2019-07-03 PROCEDURE — 1036F TOBACCO NON-USER: CPT | Performed by: NURSE PRACTITIONER

## 2019-07-03 PROCEDURE — 2022F DILAT RTA XM EVC RTNOPTHY: CPT | Performed by: NURSE PRACTITIONER

## 2019-07-03 PROCEDURE — 3045F PR MOST RECENT HEMOGLOBIN A1C LEVEL 7.0-9.0%: CPT | Performed by: NURSE PRACTITIONER

## 2019-07-03 RX ORDER — ATORVASTATIN CALCIUM 40 MG/1
40 TABLET, FILM COATED ORAL DAILY
Qty: 30 TABLET | Refills: 5 | Status: SHIPPED | OUTPATIENT
Start: 2019-07-03

## 2019-07-03 ASSESSMENT — PATIENT HEALTH QUESTIONNAIRE - PHQ9
SUM OF ALL RESPONSES TO PHQ9 QUESTIONS 1 & 2: 0
SUM OF ALL RESPONSES TO PHQ QUESTIONS 1-9: 0
2. FEELING DOWN, DEPRESSED OR HOPELESS: 0
1. LITTLE INTEREST OR PLEASURE IN DOING THINGS: 0
SUM OF ALL RESPONSES TO PHQ QUESTIONS 1-9: 0

## 2019-07-03 NOTE — PROGRESS NOTES
UNIT/ML injection vial, Inject 18 Units into the skin nightly , Disp: , Rfl:     metFORMIN (GLUCOPHAGE) 500 MG tablet, Take 500 mg by mouth 2 times daily (with meals) , Disp: , Rfl:     aspirin 81 MG chewable tablet, Take 81 mg by mouth daily, Disp: , Rfl:     Multiple Vitamins-Minerals (THERAPEUTIC MULTIVITAMIN-MINERALS) tablet, Take 1 tablet by mouth daily, Disp: , Rfl:     ATORVASTATIN CALCIUM PO, Take 40 mg by mouth daily , Disp: , Rfl:     The patient is allergic to keflex [cephalexin]. Past Medical History  Rogerio Hill  has a past medical history of CAD (coronary artery disease), Depression, Diabetes mellitus ([address] Headache, and Hyperlipidemia. Past Surgical History  The patient  has a past surgical history that includes Coronary angioplasty with stent and Tubal ligation. Family History  This patient's family history includes Cancer in her father and mother; Diabetes in her father and mother; Heart Disease in her father and mother. Social History  Rogerio Hill  reports that she has never smoked. She has never used smokeless tobacco. She reports that she does not drink alcohol or use drugs. Health Maintenance:    Health Maintenance   Topic Date Due    Pneumococcal 0-64 years Vaccine (1 of 1 - PPSV23) 12/03/1974    Diabetic foot exam  12/03/1978    Diabetic retinal exam  12/03/1978    HIV screen  12/03/1983    Diabetic microalbuminuria test  12/03/1986    Hepatitis B Vaccine (1 of 3 - Risk 3-dose series) 12/03/1987    DTaP/Tdap/Td vaccine (1 - Tdap) 12/03/1987    Cervical cancer screen  12/03/1989    Shingles Vaccine (1 of 2) 12/03/2018    Colon cancer screen colonoscopy  12/03/2018    Lipid screen  05/02/2019    Breast cancer screen  06/19/2019    Flu vaccine (1) 09/01/2019    A1C test (Diabetic or Prediabetic)  12/03/2019       Subjective:      Review of Systems   Constitutional: Negative for chills, fatigue and fever.    HENT: Negative for sinus pressure, sinus pain, sore throat, tinnitus

## 2019-07-31 ASSESSMENT — ENCOUNTER SYMPTOMS
SORE THROAT: 0
NAUSEA: 0
SINUS PAIN: 0
VOMITING: 0
DIARRHEA: 0
ABDOMINAL PAIN: 0
BLOOD IN STOOL: 0
CONSTIPATION: 0
SHORTNESS OF BREATH: 0
COUGH: 0
SINUS PRESSURE: 0
PHOTOPHOBIA: 0
ABDOMINAL DISTENTION: 0
TROUBLE SWALLOWING: 0
WHEEZING: 0

## 2019-08-21 ENCOUNTER — OFFICE VISIT (OUTPATIENT)
Dept: INTERNAL MEDICINE CLINIC | Age: 49
End: 2019-08-21
Payer: MEDICARE

## 2019-08-21 ENCOUNTER — TELEPHONE (OUTPATIENT)
Dept: INTERNAL MEDICINE CLINIC | Age: 49
End: 2019-08-21

## 2019-08-21 VITALS
BODY MASS INDEX: 27.6 KG/M2 | SYSTOLIC BLOOD PRESSURE: 116 MMHG | WEIGHT: 150 LBS | DIASTOLIC BLOOD PRESSURE: 58 MMHG | HEART RATE: 79 BPM | HEIGHT: 62 IN

## 2019-08-21 DIAGNOSIS — Z79.4 TYPE 2 DIABETES MELLITUS WITHOUT COMPLICATION, WITH LONG-TERM CURRENT USE OF INSULIN (HCC): ICD-10-CM

## 2019-08-21 DIAGNOSIS — E11.9 TYPE 2 DIABETES MELLITUS WITHOUT COMPLICATION, WITH LONG-TERM CURRENT USE OF INSULIN (HCC): ICD-10-CM

## 2019-08-21 PROCEDURE — G0108 DIAB MANAGE TRN  PER INDIV: HCPCS | Performed by: INTERNAL MEDICINE

## 2019-09-10 ENCOUNTER — HOSPITAL ENCOUNTER (EMERGENCY)
Age: 49
Discharge: HOME | End: 2019-09-10
Payer: MEDICARE

## 2019-09-10 VITALS
WEIGHT: 150 LBS | TEMPERATURE: 98.1 F | RESPIRATION RATE: 16 BRPM | HEART RATE: 76 BPM | SYSTOLIC BLOOD PRESSURE: 123 MMHG | DIASTOLIC BLOOD PRESSURE: 76 MMHG | BODY MASS INDEX: 27.6 KG/M2 | HEIGHT: 62 IN | OXYGEN SATURATION: 98 %

## 2019-09-10 DIAGNOSIS — Z76.0 ENCOUNTER FOR MEDICATION REFILL: ICD-10-CM

## 2019-09-10 DIAGNOSIS — E10.9 TYPE 1 DIABETES MELLITUS WITHOUT COMPLICATION (HCC): Primary | ICD-10-CM

## 2019-09-10 PROCEDURE — 99211 OFF/OP EST MAY X REQ PHY/QHP: CPT

## 2019-09-10 PROCEDURE — 99213 OFFICE O/P EST LOW 20 MIN: CPT | Performed by: NURSE PRACTITIONER

## 2019-09-10 ASSESSMENT — ENCOUNTER SYMPTOMS
DIARRHEA: 0
TROUBLE SWALLOWING: 0
SORE THROAT: 0
NAUSEA: 0
SHORTNESS OF BREATH: 0
BACK PAIN: 0
COUGH: 0
VOMITING: 0
RHINORRHEA: 0
SINUS PRESSURE: 0

## 2019-09-12 ENCOUNTER — OFFICE VISIT (OUTPATIENT)
Dept: INTERNAL MEDICINE CLINIC | Age: 49
End: 2019-09-12
Payer: MEDICARE

## 2019-09-12 VITALS — WEIGHT: 150 LBS | BODY MASS INDEX: 27.6 KG/M2 | HEIGHT: 62 IN

## 2019-09-12 DIAGNOSIS — E11.9 TYPE 2 DIABETES MELLITUS WITHOUT COMPLICATION, UNSPECIFIED WHETHER LONG TERM INSULIN USE (HCC): ICD-10-CM

## 2019-09-12 PROCEDURE — 97802 MEDICAL NUTRITION INDIV IN: CPT | Performed by: DIETITIAN, REGISTERED

## 2019-09-12 NOTE — PATIENT INSTRUCTIONS
1.)  Start counting the # carbohydrates are in foods by reading the label or look foods up in your booklet. - Your meal plan:   Brkf:  30 gms carbs + 1 oz protein   Lunch:  45 gms carbs + 2 oz protein + veggies   Supper:  45 gms carbs + 2 oz protein + veggies  Snack time:  15-20 gms carbs + 1 oz protein + veggies    2.)  Measure foods for accuracy in carb counting.    3.)  Cut in half the added fats to your foods, like cruz and butter. 4.)  Keep active - maybe do some toning and stretching exercises. 5.)  Bring a 1 week food log and your meter to next dietitian appt.

## 2019-09-12 NOTE — LETTER
[address] [facility]  [facility]  [address] [place], [place] [id number]  Phone: [phone]  Fax: [id number], [phone] [facility]        September 12, 2019       Patient: Terence Zamarripa   MR Number: [id number]   YOB: 1970   Date of Visit: 9/12/2019       Dear Chris Bonner CNP    Thank you for the request for consultation for Terence Zamarripa to me for the evaluation of Type II DB. Below are the relevant portions of my assessment and plan of care. Assessment:  Vitals from current and previous visits:  Ht 5' 2\" (1.575 m)   Wt 150 lb (68 kg)   BMI 27.44 kg/m²      -Body mass index is 27.44 kg/m². 25-29.9 - Overweight.   -Weight goal: lose weight. Nutrition Diagnosis:   Food and nutrition-related knowledge deficit related to Lack of previous MNT/currently undergoing MNT as evidenced by Conditions associated with a diagnosis or treatment: Type II DB and BMI 27. Plan:  Intervention:  -Impression: Pt states she has a learning disability but can read simple reading. Utilized food models to show healthy diabetic meal and snack planning.    -Instructed the patient on: Carbohydrate Counting, Consistent Carbohydrate Intake, Food Label Reading, Meal Planning for Regular, Balanced Meals & Snacks.     -Handouts given for: carbohydrate counting, food logging, healthy snacks, 150 sample meal plans/menus and weight loss tips. Patient Instructions   1.)  Start counting the # carbohydrates are in foods by reading the label or look foods up in your booklet. - Your meal plan:   Brkf:  30 gms carbs + 1 oz protein   Lunch:  45 gms carbs + 2 oz protein + veggies   Supper:  45 gms carbs + 2 oz protein + veggies  Snack time:  15-20 gms carbs + 1 oz protein + veggies    2.)  Measure foods for accuracy in carb counting.    3.)  Cut in half the added fats to your foods, like cruz and butter. 4.)  Keep active - maybe do some toning and stretching exercises.

## 2019-09-12 NOTE — COMMUNICATION BODY
understanding.  -Readiness to change: contemplation - ambivalent about change reading labels for carb counting and referring to carb counting booklet to help with portioning appropriately for carb counting. .  -Expected compliance: good. Thank you for your referral of this patient.

## 2019-09-12 NOTE — PROGRESS NOTES
[address] [place] [address]. [address] [place] [address] Kobi., Javier Ortega, [address]  [phone] (phone)  [phone] (fax)    Patient Name: Bonita Miller. Date of Birth: [id number]. MRN: [id number]      Assessment: Patient is a 46 y.o. female seen for Initial MNT visit for Type II DB.     -Nutritionally relevant labs:   Lab Results   Component Value Date/Time    LABA1C 7.2 (H) 07/03/2019 02:57 PM    LABA1C 7.0 (H) 12/03/2018 03:54 PM    LABA1C 7.1 (H) 07/23/2018 03:13 PM    GLUCOSE 139 (H) 08/31/2018 11:09 PM    GLUCOSE 184 05/02/2018    CHOL 170 05/02/2018    HDL 67 05/02/2018    LDLCALC 88 05/02/2018    TRIG 76 05/02/2018     Hx of cardiac stent - several years ago ~ 2006. Cholesterol was high. See's a cardiologist annually.      -Blood sugar trends: Downloaded meter and plan to scan into chart. Checks 1-3x/day. FBS:  82 - 158, lowest 67 but did not treat and felt ok and ate brkf. Higher FBS attributes to eating late at night. Other:  Before supper - 97, 165, 80    Pt works full time at [employer] in [employer] and was working 12 hour shifts (5 a - 5 pm), but now doing 8 hour shifts (7 - 3 pm). 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 Sometimes she works 6-7 days/week. Pt lives alone. Up @ 430 - 5 am.  -Food recall:   Breakfast: 630 - 7 am.  Goes through the drive through at [facility] just about every day for brkf - 3 pancakes OR egg mcmuffin no hashbrowns and coffee - splenda and creamer. Or [employer] breakfast bowl occ. She will bring her breakfast to work  Hardest meal at home is breakfast. When she has 1 egg and 2 sl toast at home for breakfast - she is hungry  Lunch: tunafish sandwich,  Bottled water. Today for lunch:  Rice and chicken and water. After work will  food for supper or make at home.   Dinner: rice and chicken OR chinese and gets the rice and pepper

## 2019-09-19 ENCOUNTER — TELEPHONE (OUTPATIENT)
Dept: INTERNAL MEDICINE CLINIC | Age: 49
End: 2019-09-19

## 2019-11-15 ENCOUNTER — HOSPITAL ENCOUNTER (OUTPATIENT)
Age: 49
Discharge: HOME | End: 2019-11-15
Payer: MEDICARE

## 2019-11-15 DIAGNOSIS — Z79.4 TYPE 2 DIABETES MELLITUS WITHOUT COMPLICATION, WITH LONG-TERM CURRENT USE OF INSULIN (HCC): ICD-10-CM

## 2019-11-15 DIAGNOSIS — E11.9 TYPE 2 DIABETES MELLITUS WITHOUT COMPLICATION, WITH LONG-TERM CURRENT USE OF INSULIN (HCC): ICD-10-CM

## 2019-11-15 LAB
ALBUMIN SERPL-MCNC: 4 G/DL (ref 3.5–5.1)
ALP BLD-CCNC: 80 U/L (ref 38–126)
ALT SERPL-CCNC: 9 U/L (ref 11–66)
ANION GAP SERPL CALCULATED.3IONS-SCNC: 13 MEQ/L (ref 8–16)
AST SERPL-CCNC: 22 U/L (ref 5–40)
BILIRUB SERPL-MCNC: 0.6 MG/DL (ref 0.3–1.2)
BUN BLDV-MCNC: 12 MG/DL (ref 7–22)
CALCIUM SERPL-MCNC: 9.1 MG/DL (ref 8.5–10.5)
CHLORIDE BLD-SCNC: 103 MEQ/L (ref 98–111)
CO2: 25 MEQ/L (ref 23–33)
CREAT SERPL-MCNC: 0.4 MG/DL (ref 0.4–1.2)
CREATININE, URINE: 47.2 MG/DL
GFR SERPL CREATININE-BSD FRML MDRD: > 90 ML/MIN/1.73M2
GLUCOSE BLD-MCNC: 99 MG/DL (ref 70–108)
MICROALBUMIN UR-MCNC: 1.93 MG/DL
MICROALBUMIN/CREAT UR-RTO: 41 MG/G (ref 0–30)
POTASSIUM SERPL-SCNC: 4.1 MEQ/L (ref 3.5–5.2)
SODIUM BLD-SCNC: 141 MEQ/L (ref 135–145)
TOTAL PROTEIN: 7.4 G/DL (ref 6.1–8)

## 2019-11-15 PROCEDURE — 82043 UR ALBUMIN QUANTITATIVE: CPT

## 2019-11-15 PROCEDURE — 36415 COLL VENOUS BLD VENIPUNCTURE: CPT

## 2019-11-15 PROCEDURE — 80053 COMPREHEN METABOLIC PANEL: CPT

## 2019-11-21 ENCOUNTER — OFFICE VISIT (OUTPATIENT)
Dept: INTERNAL MEDICINE CLINIC | Age: 49
End: 2019-11-21
Payer: MEDICARE

## 2019-11-21 VITALS — WEIGHT: 151.4 LBS | HEIGHT: 62 IN | BODY MASS INDEX: 27.86 KG/M2

## 2019-11-21 DIAGNOSIS — E11.9 TYPE 2 DIABETES MELLITUS WITHOUT COMPLICATION, UNSPECIFIED WHETHER LONG TERM INSULIN USE (HCC): ICD-10-CM

## 2019-11-21 PROCEDURE — 97803 MED NUTRITION INDIV SUBSEQ: CPT | Performed by: DIETITIAN, REGISTERED

## 2019-11-22 ENCOUNTER — TELEPHONE (OUTPATIENT)
Dept: INTERNAL MEDICINE CLINIC | Age: 49
End: 2019-11-22

## 2019-12-05 ENCOUNTER — OFFICE VISIT (OUTPATIENT)
Dept: INTERNAL MEDICINE CLINIC | Age: 49
End: 2019-12-05
Payer: MEDICARE

## 2019-12-05 VITALS
DIASTOLIC BLOOD PRESSURE: 74 MMHG | SYSTOLIC BLOOD PRESSURE: 120 MMHG | BODY MASS INDEX: 27.86 KG/M2 | HEIGHT: 62 IN | RESPIRATION RATE: 12 BRPM | WEIGHT: 151.4 LBS | HEART RATE: 73 BPM | OXYGEN SATURATION: 97 %

## 2019-12-05 DIAGNOSIS — E11.9 TYPE 2 DIABETES MELLITUS WITHOUT COMPLICATION, UNSPECIFIED WHETHER LONG TERM INSULIN USE (HCC): Primary | ICD-10-CM

## 2019-12-05 DIAGNOSIS — M25.552 LEFT HIP PAIN: ICD-10-CM

## 2019-12-05 LAB — HBA1C MFR BLD: 7.8 % (ref 4.3–5.7)

## 2019-12-05 PROCEDURE — 3017F COLORECTAL CA SCREEN DOC REV: CPT | Performed by: NURSE PRACTITIONER

## 2019-12-05 PROCEDURE — 2022F DILAT RTA XM EVC RTNOPTHY: CPT | Performed by: NURSE PRACTITIONER

## 2019-12-05 PROCEDURE — G8484 FLU IMMUNIZE NO ADMIN: HCPCS | Performed by: NURSE PRACTITIONER

## 2019-12-05 PROCEDURE — G8417 CALC BMI ABV UP PARAM F/U: HCPCS | Performed by: NURSE PRACTITIONER

## 2019-12-05 PROCEDURE — 99214 OFFICE O/P EST MOD 30 MIN: CPT | Performed by: NURSE PRACTITIONER

## 2019-12-05 PROCEDURE — G8427 DOCREV CUR MEDS BY ELIG CLIN: HCPCS | Performed by: NURSE PRACTITIONER

## 2019-12-05 PROCEDURE — 1036F TOBACCO NON-USER: CPT | Performed by: NURSE PRACTITIONER

## 2019-12-05 PROCEDURE — 83036 HEMOGLOBIN GLYCOSYLATED A1C: CPT | Performed by: NURSE PRACTITIONER

## 2019-12-05 PROCEDURE — 3045F PR MOST RECENT HEMOGLOBIN A1C LEVEL 7.0-9.0%: CPT | Performed by: NURSE PRACTITIONER

## 2019-12-05 ASSESSMENT — ENCOUNTER SYMPTOMS
SINUS PAIN: 0
SINUS PRESSURE: 0
BLOOD IN STOOL: 0
ABDOMINAL DISTENTION: 0
TROUBLE SWALLOWING: 0
SORE THROAT: 0
COUGH: 0
VOMITING: 0
CONSTIPATION: 0
SHORTNESS OF BREATH: 0
PHOTOPHOBIA: 0
WHEEZING: 0
ABDOMINAL PAIN: 0
DIARRHEA: 0
NAUSEA: 0

## 2019-12-13 DIAGNOSIS — E11.9 TYPE 2 DIABETES MELLITUS WITHOUT COMPLICATION, UNSPECIFIED WHETHER LONG TERM INSULIN USE (HCC): Primary | ICD-10-CM

## 2020-01-07 ENCOUNTER — NURSE ONLY (OUTPATIENT)
Dept: INTERNAL MEDICINE CLINIC | Age: 50
End: 2020-01-07
Payer: MEDICARE

## 2020-01-07 PROCEDURE — G0109 DIAB MANAGE TRN IND/GROUP: HCPCS | Performed by: INTERNAL MEDICINE

## 2020-01-22 ENCOUNTER — TELEPHONE (OUTPATIENT)
Dept: INTERNAL MEDICINE CLINIC | Age: 50
End: 2020-01-22

## 2020-03-11 ENCOUNTER — OFFICE VISIT (OUTPATIENT)
Dept: INTERNAL MEDICINE CLINIC | Age: 50
End: 2020-03-11
Payer: MEDICARE

## 2020-03-11 VITALS — BODY MASS INDEX: 28.16 KG/M2 | WEIGHT: 153 LBS | HEIGHT: 62 IN

## 2020-03-11 LAB — HBA1C MFR BLD: 7.3 % (ref 4.3–5.7)

## 2020-03-11 PROCEDURE — 97803 MED NUTRITION INDIV SUBSEQ: CPT | Performed by: DIETITIAN, REGISTERED

## 2020-03-11 PROCEDURE — 83036 HEMOGLOBIN GLYCOSYLATED A1C: CPT | Performed by: DIETITIAN, REGISTERED

## 2020-03-11 NOTE — PATIENT INSTRUCTIONS
1.)  Don't forget your vegetables with meals or have snacks. - Example:  Cucumbers, green beans, salad, tomatoes, baby carrots. 2.)  Try egg white omelette for when you go to the diner for breakfast.    3.)  Good job planning in your fresh fruit - 1 small piece of fresh fruit or 15 grapes = 15, or 2 Tablespoons of dried fruit = 15 gms carbohydrates. 4.)  1/3 cup brown rice or beans = 15 gms carbohydrates    5.)  Your meal plan is 45 gms carbohydrates/meal.    Continue to keep active at work and at home on the weekends.

## 2020-03-16 RX ORDER — INSULIN DETEMIR 100 [IU]/ML
16 INJECTION, SOLUTION SUBCUTANEOUS NIGHTLY
Qty: 5 PEN | Refills: 5 | Status: SHIPPED | OUTPATIENT
Start: 2020-03-16 | End: 2020-11-11 | Stop reason: SDUPTHER

## 2020-03-16 NOTE — TELEPHONE ENCOUNTER
Last visit- 3/11/2020  Next visit- 5/4/2020    Requested Prescriptions     Pending Prescriptions Disp Refills    insulin detemir (LEVEMIR FLEXTOUCH) 100 UNIT/ML injection pen 5 pen 1     Sig: Inject 16 Units into the skin nightly    Insulin Pen Needle 31G X 5 MM MISC 100 each 3     Sig: Use to inject insulin

## 2020-06-04 ENCOUNTER — OFFICE VISIT (OUTPATIENT)
Dept: INTERNAL MEDICINE CLINIC | Age: 50
End: 2020-06-04
Payer: MEDICARE

## 2020-06-04 PROCEDURE — 3017F COLORECTAL CA SCREEN DOC REV: CPT | Performed by: NURSE PRACTITIONER

## 2020-06-04 PROCEDURE — G8427 DOCREV CUR MEDS BY ELIG CLIN: HCPCS | Performed by: NURSE PRACTITIONER

## 2020-06-04 PROCEDURE — G8417 CALC BMI ABV UP PARAM F/U: HCPCS | Performed by: NURSE PRACTITIONER

## 2020-06-04 PROCEDURE — 1036F TOBACCO NON-USER: CPT | Performed by: NURSE PRACTITIONER

## 2020-06-04 PROCEDURE — 3051F HG A1C>EQUAL 7.0%<8.0%: CPT | Performed by: NURSE PRACTITIONER

## 2020-06-04 PROCEDURE — 99214 OFFICE O/P EST MOD 30 MIN: CPT | Performed by: NURSE PRACTITIONER

## 2020-06-04 PROCEDURE — 2022F DILAT RTA XM EVC RTNOPTHY: CPT | Performed by: NURSE PRACTITIONER

## 2020-06-04 RX ORDER — METHYLPREDNISOLONE 4 MG/1
TABLET ORAL
Qty: 1 KIT | Refills: 0 | Status: SHIPPED | OUTPATIENT
Start: 2020-06-04 | End: 2020-06-10

## 2020-06-04 NOTE — PROGRESS NOTES
2. Left hip pain    - methylPREDNISolone (MEDROL DOSEPACK) 4 MG tablet; Take by mouth. Dispense: 1 kit; Refill: 0      Return in about 3 months (around 9/4/2020). An electronic signature was used to authenticate this note.     --MIRIAN Giles - CNP on 6/21/2020 at 2:04 PM

## 2020-06-21 ASSESSMENT — ENCOUNTER SYMPTOMS
BLOOD IN STOOL: 0
VOMITING: 0
ABDOMINAL PAIN: 0
SINUS PRESSURE: 0
SHORTNESS OF BREATH: 0
PHOTOPHOBIA: 0
COUGH: 0
DIARRHEA: 0
NAUSEA: 0
CONSTIPATION: 0
ABDOMINAL DISTENTION: 0
TROUBLE SWALLOWING: 0
SORE THROAT: 0
SINUS PAIN: 0
WHEEZING: 0

## 2020-08-03 ENCOUNTER — OFFICE VISIT (OUTPATIENT)
Dept: INTERNAL MEDICINE CLINIC | Age: 50
End: 2020-08-03
Payer: MEDICARE

## 2020-08-03 VITALS
BODY MASS INDEX: 28.6 KG/M2 | HEART RATE: 89 BPM | WEIGHT: 155.4 LBS | TEMPERATURE: 97.9 F | HEIGHT: 62 IN | DIASTOLIC BLOOD PRESSURE: 64 MMHG | SYSTOLIC BLOOD PRESSURE: 118 MMHG

## 2020-08-03 LAB — HBA1C MFR BLD: 8.7 % (ref 4.3–5.7)

## 2020-08-03 PROCEDURE — 83036 HEMOGLOBIN GLYCOSYLATED A1C: CPT | Performed by: INTERNAL MEDICINE

## 2020-08-03 PROCEDURE — G0108 DIAB MANAGE TRN  PER INDIV: HCPCS | Performed by: INTERNAL MEDICINE

## 2020-08-03 RX ORDER — MELOXICAM 7.5 MG/1
7.5 TABLET ORAL DAILY PRN
COMMUNITY
End: 2021-06-05 | Stop reason: ALTCHOICE

## 2020-08-03 RX ORDER — CLOTRIMAZOLE AND BETAMETHASONE DIPROPIONATE 10; .64 MG/G; MG/G
1 CREAM TOPICAL DAILY
COMMUNITY
Start: 2018-11-02 | End: 2020-09-24 | Stop reason: ALTCHOICE

## 2020-08-03 NOTE — PROGRESS NOTES
[facility]  [address] [address] Javier Ortega, [address]  [phone] (phone)  [phone] (fax)    Patient ID: Drea Clement [id number]  Referring Provider: Yogi Madrigal. Derik Carrizales CNP     Patient's name and  were verified. Subjective:    She presents for Her follow-up diabetic visit. She has type 2 diabetes mellitus. Home regimen includes: insulin  biguanide She is noncompliant some of the time. Assessment:     Lab Results   Component Value Date    LABA1C 7.3 2020    BUN 12 11/15/2019    CREATININE 0.4 11/15/2019     There were no vitals filed for this visit. Wt Readings from Last 3 Encounters:   20 153 lb (69.4 kg)   19 151 lb 6.4 oz (68.7 kg)   19 151 lb 6.4 oz (68.7 kg)     Ht Readings from Last 3 Encounters:   20 5' 2\" (1.575 m)   19 5' 2.01\" (1.575 m)   19 5' 2\" (1.575 m)       Glucose at 4 hrs PPD today resulted at 210mg/dl  Current monitoring regimen: home blood tests - 4 times daily  Home blood sugar trends:   Any episodes of hypoglycemia? no  Previous visit with dietician: yes - 3/2020  Current diet: B sausage mcmuffin; coffee               L chicken nuggets/ diet coke/ fries               D 1/2 whopper/ freis/ coffee               BT sweets or cereal  Current exercise: walking a lot at work [employer] exam current (within one year): no; [facility]  Any history of foot problems? no  Last foot exam: 8/3/20 Pedal pulses:   peripheral pulses symmetrical   Results of monofilament test: 10/10              Skin noted to be warm and hair is reduced. Immunizations up to date: yes -   Taking ASA:  Yes  Appropriate for use of MyChart Glucose Grid:  No    Focus:     Diabetes education. A1C isup today 8.7%. Viviana alvarado is tearful. She reports she is having bad leg pain and is having trouble walking at work. She states she has had an MRI and is being referred to Dr. Mile Burkett for back issues; her appt is 1 week.  She does not feel she can keep working and is trying to get a doctor's order to be off. She states she also talked to her PCP about antidepressants or a counselor, but was not given anything. She is stress eating. Weight isup 5# from last clinic and BS's are high. Much encouragement and emotional help line # given. Discussed dietary goals after dinner and insulin action. She will return for f/u in 4 weeks with RD.     DSME PLAN:   Discussed general issues about diabetes pathophysiology and management. Counseling at today's visit: BG goals; carbs; exercise; relaxation; insulin action. 1. Set limits on any snacking after your evening meal         --sugar free jello or nuts or cheese stick or any veggies             Or sugar drink       Watch your morning blood sugars for 5-7 days      IF you your wake up blood sugars stay above 130 3-4 out of 5 days             --increase Levemir to 17 units  2. Stay as active as able  3. Be good to yourself--choose good foods for meal times  4. Try relaxation breathing or closing your eyes and finding something peaceful to focus on  5. Let us know what the plan is after you have seen Dr. Nito Caceres download, medications, PMH and nursing assessment reviewed. Zoila Connell states She is willing to participate in this plan of care and verbalized understanding of all instructions provided. Teach back used to verify comprehension. Total time involved in direct patient education: 30 minutes.

## 2020-08-03 NOTE — PATIENT INSTRUCTIONS
1. Set limits on any snacking after your evening meal         --sugar free jello or nuts or cheese stick or any veggies             Or sugar drink       Watch your morning blood sugars for 5-7 days      IF you your wake up blood sugars stay above 130 3-4 out of 5 days             --increase Levemir to 17 units  2. Stay as active as able  3. Be good to yourself--choose good foods for meal times  4. Try relaxation breathing or closing your eyes and finding something peaceful to focus on  5.  Let us know what the plan is after you have seen Dr. Anastasia Almendarez

## 2020-09-24 ENCOUNTER — OFFICE VISIT (OUTPATIENT)
Dept: INTERNAL MEDICINE CLINIC | Age: 50
End: 2020-09-24
Payer: MEDICARE

## 2020-09-24 VITALS
BODY MASS INDEX: 28.08 KG/M2 | HEART RATE: 78 BPM | WEIGHT: 152.6 LBS | DIASTOLIC BLOOD PRESSURE: 64 MMHG | HEIGHT: 62 IN | SYSTOLIC BLOOD PRESSURE: 108 MMHG

## 2020-09-24 PROCEDURE — 2022F DILAT RTA XM EVC RTNOPTHY: CPT | Performed by: NURSE PRACTITIONER

## 2020-09-24 PROCEDURE — G8427 DOCREV CUR MEDS BY ELIG CLIN: HCPCS | Performed by: NURSE PRACTITIONER

## 2020-09-24 PROCEDURE — 1036F TOBACCO NON-USER: CPT | Performed by: NURSE PRACTITIONER

## 2020-09-24 PROCEDURE — G8417 CALC BMI ABV UP PARAM F/U: HCPCS | Performed by: NURSE PRACTITIONER

## 2020-09-24 PROCEDURE — 3052F HG A1C>EQUAL 8.0%<EQUAL 9.0%: CPT | Performed by: NURSE PRACTITIONER

## 2020-09-24 PROCEDURE — 3017F COLORECTAL CA SCREEN DOC REV: CPT | Performed by: NURSE PRACTITIONER

## 2020-09-24 PROCEDURE — 99214 OFFICE O/P EST MOD 30 MIN: CPT | Performed by: NURSE PRACTITIONER

## 2020-09-24 RX ORDER — METHYLPREDNISOLONE 4 MG/1
TABLET ORAL
Qty: 1 KIT | Refills: 0 | Status: SHIPPED | OUTPATIENT
Start: 2020-09-24 | End: 2020-09-30

## 2020-09-24 RX ORDER — LANCETS 30 GAUGE
EACH MISCELLANEOUS
Qty: 100 EACH | Refills: 3 | Status: SHIPPED | OUTPATIENT
Start: 2020-09-24

## 2020-09-24 RX ORDER — BLOOD SUGAR DIAGNOSTIC
1 STRIP MISCELLANEOUS 3 TIMES DAILY
Qty: 300 EACH | Refills: 5 | Status: SHIPPED | OUTPATIENT
Start: 2020-09-24

## 2020-09-24 RX ORDER — FLUCONAZOLE 150 MG/1
150 TABLET ORAL ONCE
Qty: 1 TABLET | Refills: 0 | Status: SHIPPED | OUTPATIENT
Start: 2020-09-24 | End: 2020-09-24

## 2020-09-24 ASSESSMENT — PATIENT HEALTH QUESTIONNAIRE - PHQ9
2. FEELING DOWN, DEPRESSED OR HOPELESS: 0
SUM OF ALL RESPONSES TO PHQ9 QUESTIONS 1 & 2: 0
SUM OF ALL RESPONSES TO PHQ QUESTIONS 1-9: 0
SUM OF ALL RESPONSES TO PHQ QUESTIONS 1-9: 0
1. LITTLE INTEREST OR PLEASURE IN DOING THINGS: 0

## 2020-09-24 NOTE — PROGRESS NOTES
2020     Juhi Griggs (:  1970) is a 44 y.o. female, here for evaluation of the following medical concerns:   diabetes, left shoulder pain, and white vaginal discharge      I last seen Brandi Mar 3 months ago.      Diabetes-  Brandi Mar states she has been diabetic for 20+ years. She states her diabetes is well controlled. A1C 8.7% on 8/3, was 7.3% in 3/2020. Is on metformin 500 mg BID and Levemir 15 units daily. Last urinary microalbumin/crt ratio 41 on 11/15/19. States hypoglycemic events not present. Does verbalize understanding of hypoglycemic signs/symptoms. She has been checking blood sugars 1-2 times per day, with readings 116-257 per meter download. She is not attempting dietary modification for diabetes management and/or weight loss. Follows with the diabetes clinic. Does not report difficulty with obtaining medications. Last eye exam over 1 year ago. Voices understanding of the importance of annual dilated eye exams. BP reccs <140/90 (<130/80 in CVD risk). On aspirin 81 mg daily and atorvastatin 40 mg daily.      Denies polyuria, polydipsia, polyphagia. Denies neuropathic symptoms including numbness, tingling. Does not have any wounds to feet.      Left hip pain improved. She now has left arm pain, was seen in the ER for a trapezius strain. Pain has not subsided. Will try a medrol dose pack. She is following with Mil for counseling    Brandi Mar has an appt on  with an oncologist reportedly for white vaginal discharge and itching. I am unsure what workup has been done leading to this, however will prescribe diflucan. Review of Systems   Constitutional: Negative for chills, fatigue and fever. HENT: Negative for sinus pressure, sinus pain, sore throat, tinnitus and trouble swallowing. Eyes: Negative for photophobia and visual disturbance. Respiratory: Negative for cough, shortness of breath and wheezing.     Gastrointestinal: Negative for abdominal distention, abdominal pain, blood in stool, constipation, diarrhea, nausea and vomiting. Endocrine: Negative for polydipsia, polyphagia and polyuria. Genitourinary: Positive for vaginal discharge. Negative for difficulty urinating, dyspareunia and hematuria. Musculoskeletal: Positive for arthralgias (left shoulder pain). Negative for myalgias. Skin: Negative for rash and wound. Neurological: Negative for dizziness, light-headedness, numbness and headaches. Psychiatric/Behavioral: Negative for dysphoric mood and sleep disturbance. The patient is not nervous/anxious. Prior to Visit Medications    Medication Sig Taking?  Authorizing Provider   sertraline (ZOLOFT) 50 MG tablet Take 50 mg by mouth daily Yes Historical Provider, MD   Lancets MISC Use 3 times daily to check blood sugar DX:E11.9 Yes MIRIAN Parekh CNP   Insulin Pen Needle 31G X 5 MM MISC 1 each by Does not apply route daily Yes MIRIAN Parekh CNP   blood glucose test strips (GLUCOSE METER TEST) strip 1 each by In Vitro route 3 times daily Yes MIRIAN Parekh CNP   Blood Glucose Monitoring Suppl (ACURA BLOOD GLUCOSE METER) w/Device KIT 1 Device by Does not apply route three times daily 1 METER BRAND INSURANCE WILL COVER Yes MIRIAN Parekh CNP   meloxicam (MOBIC) 7.5 MG tablet Take 7.5 mg by mouth daily as needed  Yes Historical Provider, MD   Calcium Carb-Cholecalciferol 600-500 MG-UNIT CAPS Take 1 tablet by mouth 2 times daily Yes Historical Provider, MD   insulin detemir (LEVEMIR FLEXTOUCH) 100 UNIT/ML injection pen Inject 16 Units into the skin nightly  Patient taking differently: Inject 15 Units into the skin nightly  Yes MIRIAN Parekh CNP   Insulin Pen Needle 31G X 5 MM MISC Use to inject insulin Yes MIRIAN Parekh CNP   metFORMIN (GLUCOPHAGE) 500 MG tablet TAKE 1 TABLET BY MOUTH TWICE DAILY WITH MEALS Yes MIRIAN Parekh CNP   atorvastatin (LIPITOR) 40 MG tablet Take 1 tablet by mouth daily Yes Hadley Martines APRN - CNP   blood glucose test strips (FREESTYLE LITE) strip Test blood sugar 3 times daily DX : E11.9 Patient on insulin Yes Wanda Wright MD   aspirin 81 MG chewable tablet Take 81 mg by mouth daily Yes Historical Provider, MD   Multiple Vitamins-Minerals (THERAPEUTIC MULTIVITAMIN-MINERALS) tablet Take 1 tablet by mouth daily Yes Historical Provider, MD        Social History     Tobacco Use    Smoking status: Never Smoker    Smokeless tobacco: Never Used   Substance Use Topics    Alcohol use: No        Vitals:    09/24/20 0822   BP: 108/64   Site: Right Upper Arm   Cuff Size: Medium Adult   Pulse: 78   Weight: 152 lb 9.6 oz (69.2 kg)   Height: 5' 2\" (1.575 m)     Estimated body mass index is 27.91 kg/m² as calculated from the following:    Height as of this encounter: 5' 2\" (1.575 m). Weight as of this encounter: 152 lb 9.6 oz (69.2 kg). Physical Exam  Constitutional:       General: She is not in acute distress. Appearance: Normal appearance. She is well-developed. HENT:      Head: Normocephalic and atraumatic. Right Ear: Tympanic membrane, ear canal and external ear normal.      Left Ear: Tympanic membrane, ear canal and external ear normal.      Nose: Nose normal. No congestion or rhinorrhea. Mouth/Throat:      Mouth: Mucous membranes are moist.      Pharynx: Oropharynx is clear. No oropharyngeal exudate or posterior oropharyngeal erythema. Eyes:      Extraocular Movements: Extraocular movements intact. Conjunctiva/sclera: Conjunctivae normal.      Pupils: Pupils are equal, round, and reactive to light. Neck:      Musculoskeletal: Normal range of motion and neck supple. Thyroid: No thyromegaly. Vascular: No JVD. Cardiovascular:      Rate and Rhythm: Normal rate and regular rhythm. Heart sounds: Normal heart sounds. No murmur. No friction rub. No gallop.     Pulmonary:      Effort: Pulmonary effort is normal. No respiratory distress. Breath sounds: Normal breath sounds. No wheezing, rhonchi or rales. Abdominal:      General: Bowel sounds are normal. There is no distension. Palpations: Abdomen is soft. Tenderness: There is no abdominal tenderness. Musculoskeletal: Normal range of motion. Right lower leg: No edema. Left lower leg: No edema. Lymphadenopathy:      Cervical: No cervical adenopathy. Skin:     General: Skin is warm and dry. Capillary Refill: Capillary refill takes less than 2 seconds. Findings: No rash. Neurological:      Mental Status: She is alert and oriented to person, place, and time. Motor: No weakness. Coordination: Coordination normal.      Gait: Gait normal.   Psychiatric:         Mood and Affect: Mood normal.         Behavior: Behavior normal.         Thought Content: Thought content normal.         Judgment: Judgment normal.       ASSESSMENT/PLAN:    1. Type 2 diabetes mellitus without complication, with long-term current use of insulin ([facility])    - Lancets MISC; Use 3 times daily to check blood sugar DX:E11.9  Dispense: 100 each; Refill: 3  - Insulin Pen Needle 31G X 5 MM MISC; 1 each by Does not apply route daily  Dispense: 100 each; Refill: 3  - blood glucose test strips (GLUCOSE METER TEST) strip; 1 each by In Vitro route 3 times daily  Dispense: 300 each; Refill: 5  - Blood Glucose Monitoring Suppl (ACURA BLOOD GLUCOSE METER) w/Device KIT; 1 Device by Does not apply route three times daily 1 METER BRAND INSURANCE WILL COVER  Dispense: 1 kit; Refill: 0  - CBC; Future  - Comprehensive Metabolic Panel; Future  - Lipid Panel; Future  - Microalbumin / Creatinine Urine Ratio; Future  - Do not miss medications  - Encouraged dietary modifications and exercise regimen to promote optimal glycemic control    2. Acute pain of left shoulder    - methylPREDNISolone (MEDROL DOSEPACK) 4 MG tablet; Take by mouth. Dispense: 1 kit; Refill: 0    3.  Vaginal discharge    - fluconazole (DIFLUCAN) 150 MG tablet; Take 1 tablet by mouth once for 1 dose  Dispense: 1 tablet; Refill: 0      Return in about 6 weeks (around 11/5/2020). An electronic signature was used to authenticate this note.     --Radha Marmolejo, MIRIAN - CNP on 10/1/2020 at 6:50 PM

## 2020-09-30 ENCOUNTER — HOSPITAL ENCOUNTER (OUTPATIENT)
Age: 50
Discharge: HOME | End: 2020-09-30
Payer: MEDICARE

## 2020-09-30 DIAGNOSIS — E11.9 TYPE 2 DIABETES MELLITUS WITHOUT COMPLICATION, WITH LONG-TERM CURRENT USE OF INSULIN (HCC): ICD-10-CM

## 2020-09-30 DIAGNOSIS — Z79.4 TYPE 2 DIABETES MELLITUS WITHOUT COMPLICATION, WITH LONG-TERM CURRENT USE OF INSULIN (HCC): ICD-10-CM

## 2020-09-30 LAB
ALBUMIN SERPL-MCNC: 4 G/DL (ref 3.5–5.1)
ALP BLD-CCNC: 91 U/L (ref 38–126)
ALT SERPL-CCNC: 12 U/L (ref 11–66)
ANION GAP SERPL CALCULATED.3IONS-SCNC: 13 MEQ/L (ref 8–16)
AST SERPL-CCNC: 16 U/L (ref 5–40)
BILIRUB SERPL-MCNC: 0.8 MG/DL (ref 0.3–1.2)
BUN BLDV-MCNC: 11 MG/DL (ref 7–22)
CALCIUM SERPL-MCNC: 9.5 MG/DL (ref 8.5–10.5)
CHLORIDE BLD-SCNC: 106 MEQ/L (ref 98–111)
CHOLESTEROL, TOTAL: 174 MG/DL (ref 100–199)
CO2: 24 MEQ/L (ref 23–33)
CREAT SERPL-MCNC: 0.5 MG/DL (ref 0.4–1.2)
CREATININE, URINE: 263.2 MG/DL
ERYTHROCYTE [DISTWIDTH] IN BLOOD BY AUTOMATED COUNT: 13.9 % (ref 11.5–14.5)
ERYTHROCYTE [DISTWIDTH] IN BLOOD BY AUTOMATED COUNT: 43 FL (ref 35–45)
GFR SERPL CREATININE-BSD FRML MDRD: > 90 ML/MIN/1.73M2
GLUCOSE BLD-MCNC: 147 MG/DL (ref 70–108)
HCT VFR BLD CALC: 36.8 % (ref 37–47)
HDLC SERPL-MCNC: 70 MG/DL
HEMOGLOBIN: 11.2 GM/DL (ref 12–16)
LDL CHOLESTEROL CALCULATED: 89 MG/DL
MCH RBC QN AUTO: 25.6 PG (ref 26–33)
MCHC RBC AUTO-ENTMCNC: 30.4 GM/DL (ref 32.2–35.5)
MCV RBC AUTO: 84.2 FL (ref 81–99)
MICROALBUMIN UR-MCNC: 19.29 MG/DL
MICROALBUMIN/CREAT UR-RTO: 73 MG/G (ref 0–30)
PLATELET # BLD: 250 THOU/MM3 (ref 130–400)
PMV BLD AUTO: 11.6 FL (ref 9.4–12.4)
POTASSIUM SERPL-SCNC: 4.2 MEQ/L (ref 3.5–5.2)
RBC # BLD: 4.37 MILL/MM3 (ref 4.2–5.4)
SODIUM BLD-SCNC: 143 MEQ/L (ref 135–145)
TOTAL PROTEIN: 7.5 G/DL (ref 6.1–8)
TRIGL SERPL-MCNC: 77 MG/DL (ref 0–199)
WBC # BLD: 5.1 THOU/MM3 (ref 4.8–10.8)

## 2020-09-30 PROCEDURE — 85027 COMPLETE CBC AUTOMATED: CPT

## 2020-09-30 PROCEDURE — 80061 LIPID PANEL: CPT

## 2020-09-30 PROCEDURE — 36415 COLL VENOUS BLD VENIPUNCTURE: CPT

## 2020-09-30 PROCEDURE — 80053 COMPREHEN METABOLIC PANEL: CPT

## 2020-09-30 PROCEDURE — 82043 UR ALBUMIN QUANTITATIVE: CPT

## 2020-10-01 ASSESSMENT — ENCOUNTER SYMPTOMS
TROUBLE SWALLOWING: 0
SORE THROAT: 0
WHEEZING: 0
SHORTNESS OF BREATH: 0
DIARRHEA: 0
PHOTOPHOBIA: 0
COUGH: 0
ABDOMINAL PAIN: 0
BLOOD IN STOOL: 0
SINUS PRESSURE: 0
SINUS PAIN: 0
CONSTIPATION: 0
VOMITING: 0
ABDOMINAL DISTENTION: 0
NAUSEA: 0

## 2020-11-11 ENCOUNTER — OFFICE VISIT (OUTPATIENT)
Dept: INTERNAL MEDICINE CLINIC | Age: 50
End: 2020-11-11
Payer: MEDICARE

## 2020-11-11 VITALS
SYSTOLIC BLOOD PRESSURE: 104 MMHG | HEIGHT: 62 IN | TEMPERATURE: 97.6 F | WEIGHT: 153.2 LBS | BODY MASS INDEX: 28.19 KG/M2 | DIASTOLIC BLOOD PRESSURE: 64 MMHG | HEART RATE: 84 BPM

## 2020-11-11 LAB — HBA1C MFR BLD: 9 % (ref 4.3–5.7)

## 2020-11-11 PROCEDURE — 99214 OFFICE O/P EST MOD 30 MIN: CPT | Performed by: NURSE PRACTITIONER

## 2020-11-11 PROCEDURE — 83036 HEMOGLOBIN GLYCOSYLATED A1C: CPT | Performed by: NURSE PRACTITIONER

## 2020-11-11 PROCEDURE — 1036F TOBACCO NON-USER: CPT | Performed by: NURSE PRACTITIONER

## 2020-11-11 PROCEDURE — G8417 CALC BMI ABV UP PARAM F/U: HCPCS | Performed by: NURSE PRACTITIONER

## 2020-11-11 PROCEDURE — G8427 DOCREV CUR MEDS BY ELIG CLIN: HCPCS | Performed by: NURSE PRACTITIONER

## 2020-11-11 PROCEDURE — 3017F COLORECTAL CA SCREEN DOC REV: CPT | Performed by: NURSE PRACTITIONER

## 2020-11-11 PROCEDURE — G8484 FLU IMMUNIZE NO ADMIN: HCPCS | Performed by: NURSE PRACTITIONER

## 2020-11-11 PROCEDURE — 2022F DILAT RTA XM EVC RTNOPTHY: CPT | Performed by: NURSE PRACTITIONER

## 2020-11-11 PROCEDURE — 3052F HG A1C>EQUAL 8.0%<EQUAL 9.0%: CPT | Performed by: NURSE PRACTITIONER

## 2020-11-11 RX ORDER — GLIMEPIRIDE 2 MG/1
2 TABLET ORAL
Qty: 30 TABLET | Refills: 5 | Status: SHIPPED | OUTPATIENT
Start: 2020-11-11 | End: 2021-01-13 | Stop reason: SINTOL

## 2020-11-11 RX ORDER — INSULIN DETEMIR 100 [IU]/ML
20 INJECTION, SOLUTION SUBCUTANEOUS NIGHTLY
Qty: 5 PEN | Refills: 5 | Status: SHIPPED | OUTPATIENT
Start: 2020-11-11 | End: 2021-04-05

## 2020-11-11 NOTE — PROGRESS NOTES
2020     Lexis Dudley (:  1970) is a 44 y.o. female, here for evaluation of the following medical concerns: diabetes     I last seen Ivy Youssef 6 weeks ago. No ER visits or hospitalizations since last visit.      Diabetes-  Ivy Youssef states she has been diabetic for 20+ years. She states her diabetes is well controlled. A1C was 9.0% today in office, was 8.7% on 8/3. Is on metformin 500 mg BID and Levemir 15 units daily. Last urinary microalbumin/crt ratio 41 on 11/15/19. States hypoglycemic events not present. Does verbalize understanding of hypoglycemic signs/symptoms ------ mouth dryness, polyuria, and polydipsia. She has been checking blood sugars 1-2 times per day, with readings 150-250 per patient statement. She is not attempting dietary modification for diabetes management and/or weight loss. Follows with the diabetes clinic. Does not report difficulty with obtaining medications. Last eye exam over 1 year ago, next is scheduled Dec. 4th. On aspirin 81 mg daily and atorvastatin 40 mg daily.      Denies polyuria, polydipsia, polyphagia. Denies neuropathic symptoms including numbness, tingling. Does not have any wounds to feet.      Left arm pain improved with medrol dose pack.     She is following with [facility] for counseling.     Candida improved with Diflucan. Review of Systems   Constitutional: Negative for chills, fatigue and fever. HENT: Negative for sinus pressure, sinus pain, sore throat, tinnitus and trouble swallowing. Eyes: Negative for photophobia and visual disturbance. Respiratory: Negative for cough, shortness of breath and wheezing. Gastrointestinal: Negative for abdominal distention, abdominal pain, blood in stool, constipation, diarrhea, nausea and vomiting. Endocrine: Negative for polydipsia, polyphagia and polyuria. Genitourinary: Negative for difficulty urinating, dyspareunia, hematuria and vaginal discharge. Musculoskeletal: Positive for arthralgias.  Negative Never Used   Substance Use Topics    Alcohol use: No        Vitals:    11/11/20 0835   BP: 104/64   Site: Right Upper Arm   Cuff Size: Small Adult   Pulse: 84   Temp: 97.6 °F (36.4 °C)   TempSrc: Temporal   Weight: 153 lb 3.2 oz (69.5 kg)   Height: 5' 2\" (1.575 m)     Estimated body mass index is 28.02 kg/m² as calculated from the following:    Height as of this encounter: 5' 2\" (1.575 m). Weight as of this encounter: 153 lb 3.2 oz (69.5 kg). Physical Exam  Constitutional:       General: She is not in acute distress. Appearance: Normal appearance. She is well-developed. HENT:      Head: Normocephalic and atraumatic. Right Ear: Tympanic membrane, ear canal and external ear normal.      Left Ear: Tympanic membrane, ear canal and external ear normal.      Nose: Nose normal. No congestion or rhinorrhea. Mouth/Throat:      Mouth: Mucous membranes are moist.      Pharynx: Oropharynx is clear. No oropharyngeal exudate or posterior oropharyngeal erythema. Eyes:      Extraocular Movements: Extraocular movements intact. Conjunctiva/sclera: Conjunctivae normal.      Pupils: Pupils are equal, round, and reactive to light. Neck:      Musculoskeletal: Normal range of motion and neck supple. Thyroid: No thyromegaly. Vascular: No JVD. Cardiovascular:      Rate and Rhythm: Normal rate and regular rhythm. Heart sounds: Normal heart sounds. No murmur. No friction rub. No gallop. Pulmonary:      Effort: Pulmonary effort is normal. No respiratory distress. Breath sounds: Normal breath sounds. No wheezing, rhonchi or rales. Abdominal:      General: Bowel sounds are normal. There is no distension. Palpations: Abdomen is soft. Tenderness: There is no abdominal tenderness. Musculoskeletal: Normal range of motion. Right lower leg: No edema. Left lower leg: No edema. Lymphadenopathy:      Cervical: No cervical adenopathy.    Skin:     General: Skin is warm and dry.      Capillary Refill: Capillary refill takes less than 2 seconds. Findings: No rash. Neurological:      Mental Status: She is alert and oriented to person, place, and time. Motor: No weakness. Coordination: Coordination normal.      Gait: Gait normal.   Psychiatric:         Mood and Affect: Mood normal.         Behavior: Behavior normal.         Thought Content: Thought content normal.         Judgment: Judgment normal.       ASSESSMENT/PLAN:    1. Type 2 diabetes mellitus without complication, unspecified whether long term insulin use ([facility])    - POCT glycosylated hemoglobin (Hb A1C)  - insulin detemir (LEVEMIR FLEXTOUCH) 100 UNIT/ML injection pen; Inject 20 Units into the skin nightly  Dispense: 5 pen; Refill: 5  - glimepiride (AMARYL) 2 MG tablet; Take 1 tablet by mouth every morning (before breakfast)  Dispense: 30 tablet; Refill: 5  - Increase Levemir to 20 units nightly  - Encouraged dietary modifications and exercise regimen to promote optimal glycemic control    Return in about 5 weeks (around 12/16/2020). An electronic signature was used to authenticate this note.     --MIRIAN Chawla - CNP on 11/30/2020 at 4:21 PM

## 2020-11-30 ASSESSMENT — ENCOUNTER SYMPTOMS
COUGH: 0
TROUBLE SWALLOWING: 0
VOMITING: 0
DIARRHEA: 0
WHEEZING: 0
SINUS PRESSURE: 0
CONSTIPATION: 0
SHORTNESS OF BREATH: 0
SORE THROAT: 0
SINUS PAIN: 0
ABDOMINAL PAIN: 0
NAUSEA: 0
PHOTOPHOBIA: 0
BLOOD IN STOOL: 0
ABDOMINAL DISTENTION: 0

## 2020-12-17 ENCOUNTER — TELEPHONE (OUTPATIENT)
Dept: INTERNAL MEDICINE CLINIC | Age: 50
End: 2020-12-17

## 2020-12-17 NOTE — TELEPHONE ENCOUNTER
Patient came in today thinking that she had an appointment but it was actually yesterday, 12/16/2020. She stated that she needed help with her meter with setting the time and day. I showed her how to do that. She then stated that yesterday she had a dizzy spell so bad that she almost went to the ER. She asked if I could check her blood pressure. I checked it and it was 122/72. I let her know that was a good result. I asked her if she thought she was having a low blood sugar at that time. She said she didn't think so. When she left the house, her blood sugar was 150. She then told me that it was 4-5 hours later that she became dizzy. I told her that she should check her blood sugar if she feels that way again because even though her blood sugar was 150, it could drop in 4-5 hours. I then said if her blood sugar was ok when she checked, then to also check her blood pressure. Patient verbalized understanding. She then rescheduled her appointment with you.

## 2021-01-13 ENCOUNTER — OFFICE VISIT (OUTPATIENT)
Dept: INTERNAL MEDICINE CLINIC | Age: 51
End: 2021-01-13
Payer: MEDICARE

## 2021-01-13 ENCOUNTER — CARE COORDINATION (OUTPATIENT)
Dept: CARE COORDINATION | Age: 51
End: 2021-01-13

## 2021-01-13 VITALS
WEIGHT: 150 LBS | DIASTOLIC BLOOD PRESSURE: 74 MMHG | BODY MASS INDEX: 27.6 KG/M2 | HEART RATE: 81 BPM | HEIGHT: 62 IN | SYSTOLIC BLOOD PRESSURE: 118 MMHG

## 2021-01-13 DIAGNOSIS — E11.9 TYPE 2 DIABETES MELLITUS WITHOUT COMPLICATION, UNSPECIFIED WHETHER LONG TERM INSULIN USE (HCC): ICD-10-CM

## 2021-01-13 PROCEDURE — 2022F DILAT RTA XM EVC RTNOPTHY: CPT | Performed by: NURSE PRACTITIONER

## 2021-01-13 PROCEDURE — 3046F HEMOGLOBIN A1C LEVEL >9.0%: CPT | Performed by: NURSE PRACTITIONER

## 2021-01-13 PROCEDURE — G8484 FLU IMMUNIZE NO ADMIN: HCPCS | Performed by: NURSE PRACTITIONER

## 2021-01-13 PROCEDURE — G8427 DOCREV CUR MEDS BY ELIG CLIN: HCPCS | Performed by: NURSE PRACTITIONER

## 2021-01-13 PROCEDURE — 99214 OFFICE O/P EST MOD 30 MIN: CPT | Performed by: NURSE PRACTITIONER

## 2021-01-13 PROCEDURE — G8417 CALC BMI ABV UP PARAM F/U: HCPCS | Performed by: NURSE PRACTITIONER

## 2021-01-13 PROCEDURE — 1036F TOBACCO NON-USER: CPT | Performed by: NURSE PRACTITIONER

## 2021-01-13 PROCEDURE — 3017F COLORECTAL CA SCREEN DOC REV: CPT | Performed by: NURSE PRACTITIONER

## 2021-01-13 ASSESSMENT — PATIENT HEALTH QUESTIONNAIRE - PHQ9
SUM OF ALL RESPONSES TO PHQ QUESTIONS 1-9: 0
1. LITTLE INTEREST OR PLEASURE IN DOING THINGS: 0
2. FEELING DOWN, DEPRESSED OR HOPELESS: 0
SUM OF ALL RESPONSES TO PHQ QUESTIONS 1-9: 0

## 2021-01-13 NOTE — CARE COORDINATION
Spoke with pt on phone. Introduced self an dmy role. Discussed transportation issues with pt. Provided her a # to call [facility] and informed her of what options to push. Discussed other options for transportation with with pt. Encouraged pt to call me back with and questions. Pt voiced understanding.

## 2021-01-13 NOTE — PROGRESS NOTES
[facility]GEMENT  [address]  Dept: [phone]  Dept Fax: [phone]: [phone]     Visit Date:  2021    Patient:  Dmitriy Stoll  YOB: 1970    HPI:     Chief Complaint   Patient presents with    Diabetes     Dmitriy Stoll (:  1970) is a 44 y.o. female, here for evaluation of the following medical concerns: diabetes     I last seen Dione 6 weeks ago. No ER visits or hospitalizations since last visit. Diabetes-  [facility] states she has been diabetic for 20+ years. She states her diabetes is well controlled. A1C was 9.0% 2020. Is on metformin 500 mg BID and Levemir 18 units daily. Started glimepiride at last visit, however she states that she took it once and became diaphoretic, blood sugar was 80. She did not eat with it. Last urinary microalbumin/crt ratio 73 on 2020. Verbalizes understanding of hypoglycemic signs/symptoms. She has been checking blood sugars 1-2 times per day, with readings 120-200s per patient statement. Did not bring meter for download. She is not attempting dietary modification for diabetes management and/or weight loss. Follows with the diabetes clinic. Does not report difficulty with obtaining medications. Last eye exam Dec. 4th- she reports that she was told that she has some bleeding behind the eye, and is scheduled for follow up in March. Will attempt to obtain records from ophthalmologist. On aspirin 81 mg daily and atorvastatin 40 mg daily.      Denies polyuria, polydipsia, polyphagia. Denies neuropathic symptoms including numbness, tingling. Does not have any wounds to feet.      Ye Paul continues to complain of left hip pain. She is having difficulty standing or walking. Taking Meloxicam BID with little relief. Scheduled to see ortho . Encouraged to arrange follow up with Dr. Paulette Elliott as well.  She is currently receiving physical therapy. She also complains of difficulty with transportation. She is not able to work currently. Awaiting disability. Will reach out to [facility] to check on available resources. Medications    Current Outpatient Medications:     metFORMIN (GLUCOPHAGE) 1000 MG tablet, TAKE 1 TABLET TWICE DAILY WITH MEALS, Disp: 60 tablet, Rfl: 3    insulin detemir (LEVEMIR FLEXTOUCH) 100 UNIT/ML injection pen, Inject 20 Units into the skin nightly (Patient taking differently: Inject 18 Units into the skin nightly ), Disp: 5 pen, Rfl: 5    sertraline (ZOLOFT) 50 MG tablet, Take 50 mg by mouth daily, Disp: , Rfl:     Lancets MISC, Use 3 times daily to check blood sugar DX:E11.9, Disp: 100 each, Rfl: 3    Insulin Pen Needle 31G X 5 MM MISC, 1 each by Does not apply route daily, Disp: 100 each, Rfl: 3    blood glucose test strips (GLUCOSE METER TEST) strip, 1 each by In Vitro route 3 times daily, Disp: 300 each, Rfl: 5    Blood Glucose Monitoring Suppl (ACURA BLOOD GLUCOSE METER) w/Device KIT, 1 Device by Does not apply route three times daily 1 METER BRAND INSURANCE WILL COVER, Disp: 1 kit, Rfl: 0    meloxicam (MOBIC) 7.5 MG tablet, Take 7.5 mg by mouth daily as needed , Disp: , Rfl:     Calcium Carb-Cholecalciferol 600-500 MG-UNIT CAPS, Take 1 tablet by mouth 2 times daily, Disp: , Rfl:     atorvastatin (LIPITOR) 40 MG tablet, Take 1 tablet by mouth daily, Disp: 30 tablet, Rfl: 5    aspirin 81 MG chewable tablet, Take 81 mg by mouth daily, Disp: , Rfl:     Multiple Vitamins-Minerals (THERAPEUTIC MULTIVITAMIN-MINERALS) tablet, Take 1 tablet by mouth daily, Disp: , Rfl:     The patient is allergic to keflex [cephalexin]. Past Medical History  Mac Rodriguez  has a past medical history of CAD (coronary artery disease), Depression, Diabetes mellitus ([address] Headache, and Hyperlipidemia.     Past Surgical History  The patient  has a past surgical history that includes Coronary angioplasty with stent and Tubal ligation. Family History  This patient's family history includes Cancer in her father and mother; Diabetes in her father and mother; Heart Disease in her father and mother. Social History  Randell Garcia  reports that she has never smoked. She has never used smokeless tobacco. She reports that she does not drink alcohol or use drugs. Health Maintenance:    Health Maintenance   Topic Date Due    Hepatitis C screen  1970    Pneumococcal 0-64 years Vaccine (1 of 1 - PPSV23) 12/03/1974    Diabetic retinal exam  12/03/1978    HIV screen  12/03/1983    Hepatitis B vaccine (1 of 3 - Risk 3-dose series) 12/03/1987    DTaP/Tdap/Td vaccine (1 - Tdap) 12/03/1987    Cervical cancer screen  12/03/1989    Shingles Vaccine (1 of 2) 12/03/2018    Colon cancer screen colonoscopy  12/03/2018    Breast cancer screen  06/19/2019    Annual Wellness Visit (AWV)  06/19/2019    A1C test (Diabetic or Prediabetic)  02/11/2021    Diabetic foot exam  08/03/2021    Diabetic microalbuminuria test  09/30/2021    Lipid screen  09/30/2021    Flu vaccine  Completed    Hepatitis A vaccine  Aged Out    Hib vaccine  Aged Out    Meningococcal (ACWY) vaccine  Aged Out       Subjective:      Review of Systems   Constitutional: Negative for chills, fatigue and fever. HENT: Negative for sinus pressure, sinus pain, sore throat, tinnitus and trouble swallowing. Eyes: Negative for photophobia and visual disturbance. Respiratory: Negative for cough, shortness of breath and wheezing. Gastrointestinal: Negative for abdominal distention, abdominal pain, blood in stool, constipation, diarrhea, nausea and vomiting. Endocrine: Negative for polydipsia, polyphagia and polyuria. Genitourinary: Negative for difficulty urinating, dyspareunia, hematuria and vaginal discharge. Musculoskeletal: Positive for arthralgias. Negative for myalgias. Skin: Negative for rash and wound.    Neurological: Negative for dizziness, light-headedness, numbness and headaches. Psychiatric/Behavioral: Negative for dysphoric mood and sleep disturbance. The patient is not nervous/anxious. Objective:     /74   Pulse 81   Ht 5' 2\" (1.575 m)   Wt 150 lb (68 kg)   BMI 27.44 kg/m²     Physical Exam  Constitutional:       General: She is not in acute distress. Appearance: Normal appearance. She is well-developed. HENT:      Head: Normocephalic and atraumatic. Right Ear: Tympanic membrane, ear canal and external ear normal.      Left Ear: Tympanic membrane, ear canal and external ear normal.      Nose: Nose normal. No congestion or rhinorrhea. Mouth/Throat:      Mouth: Mucous membranes are moist.      Pharynx: Oropharynx is clear. No oropharyngeal exudate or posterior oropharyngeal erythema. Eyes:      Extraocular Movements: Extraocular movements intact. Conjunctiva/sclera: Conjunctivae normal.      Pupils: Pupils are equal, round, and reactive to light. Neck:      Musculoskeletal: Normal range of motion and neck supple. Thyroid: No thyromegaly. Vascular: No JVD. Cardiovascular:      Rate and Rhythm: Normal rate and regular rhythm. Heart sounds: Normal heart sounds. No murmur. No friction rub. No gallop. Pulmonary:      Effort: Pulmonary effort is normal. No respiratory distress. Breath sounds: Normal breath sounds. No wheezing, rhonchi or rales. Abdominal:      General: Bowel sounds are normal. There is no distension. Palpations: Abdomen is soft. Tenderness: There is no abdominal tenderness. Musculoskeletal: Normal range of motion. Right lower leg: No edema. Left lower leg: No edema. Lymphadenopathy:      Cervical: No cervical adenopathy. Skin:     General: Skin is warm and dry. Capillary Refill: Capillary refill takes less than 2 seconds. Findings: No rash. Neurological:      Mental Status: She is alert and oriented to person, place, and time. Motor: No weakness. Coordination: Coordination normal.      Gait: Gait normal.   Psychiatric:         Mood and Affect: Mood normal.         Behavior: Behavior normal.         Thought Content: Thought content normal.         Judgment: Judgment normal.         Labs Reviewed 1/13/2021:    Lab Results   Component Value Date    WBC 5.1 09/30/2020    HGB 11.2 (L) 09/30/2020    HCT 36.8 (L) 09/30/2020     09/30/2020    CHOL 174 09/30/2020    TRIG 77 09/30/2020    HDL 70 09/30/2020    ALT 12 09/30/2020    AST 16 09/30/2020     09/30/2020    K 4.2 09/30/2020     09/30/2020    CREATININE 0.5 09/30/2020    BUN 11 09/30/2020    CO2 24 09/30/2020    TSH 2.680 03/13/2019    LABA1C 9.0 (H) 11/11/2020    LABMICR 19.29 09/30/2020       Assessment/Plan      1. Type 2 diabetes mellitus without complication, unspecified whether long term insulin use (HCC)    - metFORMIN (GLUCOPHAGE) 1000 MG tablet; TAKE 1 TABLET TWICE DAILY WITH MEALS  Dispense: 60 tablet; Refill: 3  - Discussed the need to potentially initiate insulin administration, patient declines at this time. She does voice understanding in controlling blood sugars. Diabetes counseling completed. - Bring meter to next visit for download. - Call with readings in 2 weeks  - Encouraged dietary modifications and exercise regimen to promote optimal glycemic control  - Obtain records from ophthalmology  - Diabetic foot exam at next visit    Return in about 3 months (around 4/13/2021). Patient given educational materials - see patient instructions. Discussed use, benefit, and side effects of prescribed medications. All patient questions answered. Pt voiced understanding. Reviewed health maintenance.        Electronically signed MIRIAN Villareal - CNP on 1/13/21 at 8:50 AM EST

## 2021-01-13 NOTE — PATIENT INSTRUCTIONS
Arrange visit with Dr. Nuvia Brooks [phone]    Call in 2 weeks with updated blood sugar readings [phone]

## 2021-01-24 ASSESSMENT — ENCOUNTER SYMPTOMS
TROUBLE SWALLOWING: 0
WHEEZING: 0
CONSTIPATION: 0
DIARRHEA: 0
ABDOMINAL DISTENTION: 0
SHORTNESS OF BREATH: 0
SINUS PRESSURE: 0
PHOTOPHOBIA: 0
NAUSEA: 0
BLOOD IN STOOL: 0
COUGH: 0
SINUS PAIN: 0
SORE THROAT: 0
ABDOMINAL PAIN: 0
VOMITING: 0

## 2021-03-06 ENCOUNTER — LAB REQUISITION (OUTPATIENT)
Dept: LAB | Age: 51
End: 2021-03-06

## 2021-03-06 PROCEDURE — 86480 TB TEST CELL IMMUN MEASURE: CPT | Performed by: CLINICAL MEDICAL LABORATORY

## 2021-03-06 PROCEDURE — PSEU9049 QUANTIFERON TB PLUS: Performed by: CLINICAL MEDICAL LABORATORY

## 2021-03-08 LAB
GAMMA INTERFERON BACKGROUND BLD IA-ACNC: 0.06 IU/ML
M TB IFN-G BLD-IMP: NEGATIVE
M TB IFN-G CD4+ BCKGRND COR BLD-ACNC: 0.25 IU/ML
M TB IFN-G CD4+CD8+ BCKGRND COR BLD-ACNC: 0.28 IU/ML
MITOGEN IGNF BCKGRD COR BLD-ACNC: >10 IU/ML

## 2021-04-01 DIAGNOSIS — E11.9 TYPE 2 DIABETES MELLITUS WITHOUT COMPLICATION, UNSPECIFIED WHETHER LONG TERM INSULIN USE (HCC): ICD-10-CM

## 2021-04-05 RX ORDER — INSULIN DETEMIR 100 [IU]/ML
18 INJECTION, SOLUTION SUBCUTANEOUS NIGHTLY
Qty: 3 PEN | Refills: 3 | Status: SHIPPED | OUTPATIENT
Start: 2021-04-05 | End: 2021-06-17

## 2021-06-05 ENCOUNTER — APPOINTMENT (OUTPATIENT)
Dept: GENERAL RADIOLOGY | Age: 51
End: 2021-06-05
Payer: MEDICARE

## 2021-06-05 ENCOUNTER — HOSPITAL ENCOUNTER (EMERGENCY)
Age: 51
Discharge: HOME | End: 2021-06-05
Payer: MEDICARE

## 2021-06-05 VITALS
BODY MASS INDEX: 27.44 KG/M2 | RESPIRATION RATE: 16 BRPM | TEMPERATURE: 97.2 F | OXYGEN SATURATION: 99 % | DIASTOLIC BLOOD PRESSURE: 71 MMHG | HEIGHT: 62 IN | HEART RATE: 94 BPM | SYSTOLIC BLOOD PRESSURE: 147 MMHG

## 2021-06-05 DIAGNOSIS — S76.012A MUSCLE STRAIN OF GLUTEAL REGION, LEFT, INITIAL ENCOUNTER: Primary | ICD-10-CM

## 2021-06-05 PROCEDURE — 73502 X-RAY EXAM HIP UNI 2-3 VIEWS: CPT

## 2021-06-05 PROCEDURE — 99213 OFFICE O/P EST LOW 20 MIN: CPT

## 2021-06-05 PROCEDURE — 99213 OFFICE O/P EST LOW 20 MIN: CPT | Performed by: NURSE PRACTITIONER

## 2021-06-05 RX ORDER — KETOROLAC TROMETHAMINE 10 MG/1
10 TABLET, FILM COATED ORAL EVERY 8 HOURS PRN
Qty: 15 TABLET | Refills: 0 | Status: SHIPPED | OUTPATIENT
Start: 2021-06-05

## 2021-06-05 ASSESSMENT — PAIN SCALES - GENERAL: PAINLEVEL_OUTOF10: 5

## 2021-06-05 ASSESSMENT — PAIN DESCRIPTION - ORIENTATION: ORIENTATION: LEFT

## 2021-06-05 ASSESSMENT — ENCOUNTER SYMPTOMS: COLOR CHANGE: 0

## 2021-06-05 ASSESSMENT — PAIN DESCRIPTION - DIRECTION: RADIATING_TOWARDS: DOWN LEFT LEG

## 2021-06-05 ASSESSMENT — PAIN DESCRIPTION - LOCATION: LOCATION: LEG;BUTTOCKS

## 2021-06-05 NOTE — ED NOTES
Presents with c/o left buttock and leg pain, started 5/31/21. St pain worse with sitting and walking. Pt ambulates with slight limp, using cane.      Dmitriy Boyd RN  06/05/21 [id number]

## 2021-06-05 NOTE — ED PROVIDER NOTES
[address]  Urgent Care Encounter       CHIEF COMPLAINT       Chief Complaint   Patient presents with    Leg Pain       Nurses Notes reviewed and I agree except as noted in the HPI. HISTORY OF PRESENT ILLNESS   Chadd Hammer is a 44 y.o. female who presents to the urgent care center complaining of pain to the left hip and buttocks states that she has pain that goes down the left thigh. Patient denies any known history of trauma. Patient rates her pain 5 on a 10 scale states it is worse with standing and sitting. Patient denied any heaviness or weakness of the lower leg. Patient is walking without any assistance. She does use a cane for support. Patient denies any change in urine or elimination. The history is provided by the patient. No  was used. Leg Pain  This is a new problem. The current episode started more than 1 week ago. The problem occurs constantly. The problem has been gradually worsening. REVIEW OF SYSTEMS     Review of Systems   Constitutional: Positive for activity change. Musculoskeletal: Positive for gait problem. Negative for arthralgias and joint swelling. Left leg and buttocks   Skin: Negative for color change. PAST MEDICAL HISTORY         Diagnosis Date    CAD (coronary artery disease)     Depression     Diabetes mellitus ([facility] [address]     Headache     Hyperlipidemia        SURGICALHISTORY     Patient  has a past surgical history that includes Coronary angioplasty with stent and Tubal ligation.     CURRENT MEDICATIONS       Discharge Medication List as of 6/5/2021  4:26 PM      CONTINUE these medications which have NOT CHANGED    Details   insulin detemir (LEVEMIR FLEXTOUCH) 100 UNIT/ML injection pen Inject 18 Units into the skin nightly, Disp-3 pen, R-3Normal      metFORMIN (GLUCOPHAGE) 1000 MG tablet TAKE 1 TABLET TWICE DAILY WITH MEALS, Disp-60 tablet, R-3Normal      sertraline (ZOLOFT) 50 MG tablet Take 50 mg by mouth dailyHistorical Med      Lancets MISC Disp-100 each,R-3, NormalUse 3 times daily to check blood sugar DX:E11.9      Insulin Pen Needle 31G X 5 MM MISC DAILY Starting Thu 9/24/2020, Disp-100 each,R-3, Normal      blood glucose test strips (GLUCOSE METER TEST) strip 1 each by In Vitro route 3 times daily, Disp-300 each,R-5BRAND INSURANCE WILL COVERNormal      Blood Glucose Monitoring Suppl (ACURA BLOOD GLUCOSE METER) w/Device KIT 3 TIMES DAILY Starting Thu 9/24/2020, Disp-1 kit,R-0, Normal1 METER BRAND INSURANCE WILL COVER      Calcium Carb-Cholecalciferol 600-500 MG-UNIT CAPS Take 1 tablet by mouth 2 times dailyHistorical Med      atorvastatin (LIPITOR) 40 MG tablet Take 1 tablet by mouth daily, Disp-30 tablet, R-5Normal      aspirin 81 MG chewable tablet Take 81 mg by mouth dailyHistorical Med      Multiple Vitamins-Minerals (THERAPEUTIC MULTIVITAMIN-MINERALS) tablet Take 1 tablet by mouth dailyHistorical Med             ALLERGIES     Patient is is allergic to keflex [cephalexin]. Patients   Immunization History   Administered Date(s) Administered    Influenza Virus Vaccine 10/25/2017, 09/30/2018, 11/13/2019, 09/05/2020    Influenza, Javier Hum, IM, (6 mo and older Fluzone, Flulaval, Fluarix and 3 yrs and older Afluria) 11/13/2019       FAMILY HISTORY     Patient's family history includes Cancer in her father and mother; Diabetes in her father and mother; Heart Disease in her father and mother. SOCIAL HISTORY     Patient  reports that she has never smoked. She has never used smokeless tobacco. She reports that she does not drink alcohol and does not use drugs. PHYSICAL EXAM     ED TRIAGE VITALS  BP: (!) 147/71, Temp: 97.2 °F (36.2 °C), Pulse: 94, Resp: 16, SpO2: 99 %,Estimated body mass index is 27.44 kg/m² as calculated from the following:    Height as of this encounter: 5' 2\" (1.575 m). Weight as of 1/13/21: 150 lb (68 kg). ,No LMP recorded.  Patient is perimenopausal.    Physical Exam  Vitals and nursing note reviewed. Constitutional:       General: She is not in acute distress. Appearance: Normal appearance. She is well-developed. She is not ill-appearing, toxic-appearing or diaphoretic. HENT:      Head: Normocephalic. Right Ear: External ear normal.      Left Ear: External ear normal.      Nose: Nose normal.   Cardiovascular:      Rate and Rhythm: Normal rate. Pulmonary:      Effort: Pulmonary effort is normal.   Musculoskeletal:         General: Tenderness present. Cervical back: Full passive range of motion without pain, normal range of motion and neck supple. Back:       Comments: Left buttocks with pain going down the left leg   Skin:     General: Skin is warm and dry. Capillary Refill: Capillary refill takes less than 2 seconds. Neurological:      Mental Status: She is alert and oriented to person, place, and time. Psychiatric:         Behavior: Behavior is cooperative. DIAGNOSTIC RESULTS     Labs:No results found for this visit on 06/05/21. IMAGING:    XR HIP LEFT (2-3 VIEWS)   Final Result   No acute fracture or dislocation involving the left hip. **This report has been created using voice recognition software. It may contain minor errors which are inherent in voice recognition technology. **      Final report electronically signed by Dr Candida Kelly on 6/5/2021 4:18 PM            EKG:      URGENT CARE COURSE:     Vitals:    06/05/21 1532   BP: (!) 147/71   Pulse: 94   Resp: 16   Temp: 97.2 °F (36.2 °C)   SpO2: 99%   Height: 5' 2\" (1.575 m)       Medications - No data to display         PROCEDURES:  None    FINAL IMPRESSION      1. Muscle strain of gluteal region, left, initial encounter          DISPOSITION/ PLAN     heat 15-20 minutes 3 times a day    Activity as tolerated.     Take medication as directed  Return for worsening symptoms, otherwise if symptoms persist follow up orthopedics in 1 to 3 days    PATIENT REFERRED TO:  Blake Buchanan [facility] [id number]      DISCHARGE MEDICATIONS:  Discharge Medication List as of 6/5/2021  4:26 PM      START taking these medications    Details   ketorolac (TORADOL) 10 MG tablet Take 1 tablet by mouth every 8 hours as needed for Pain, Disp-15 tablet, R-0Normal             Discharge Medication List as of 6/5/2021  4:26 PM          Discharge Medication List as of 6/5/2021  4:26 PM          MIRIAN Vines CNP    (Please note that portions of this note were completed with a voice recognition program. Efforts were made to edit the dictations but occasionally words are mis-transcribed.)           MIRIAN Vines CNP  06/05/21 [id number]

## 2021-06-17 DIAGNOSIS — E11.9 TYPE 2 DIABETES MELLITUS WITHOUT COMPLICATION, UNSPECIFIED WHETHER LONG TERM INSULIN USE (HCC): ICD-10-CM

## 2021-06-17 RX ORDER — INSULIN DETEMIR 100 [IU]/ML
INJECTION, SOLUTION SUBCUTANEOUS
Qty: 30 ML | Refills: 0 | Status: SHIPPED | OUTPATIENT
Start: 2021-06-17 | End: 2021-08-30

## 2021-07-31 ENCOUNTER — HOSPITAL ENCOUNTER (EMERGENCY)
Age: 51
Discharge: HOME | End: 2021-07-31
Payer: MEDICARE

## 2021-07-31 VITALS
HEART RATE: 89 BPM | OXYGEN SATURATION: 97 % | TEMPERATURE: 97.8 F | SYSTOLIC BLOOD PRESSURE: 139 MMHG | RESPIRATION RATE: 19 BRPM | DIASTOLIC BLOOD PRESSURE: 62 MMHG

## 2021-07-31 DIAGNOSIS — R30.0 DYSURIA: Primary | ICD-10-CM

## 2021-07-31 DIAGNOSIS — N89.8 VAGINAL ITCHING: ICD-10-CM

## 2021-07-31 LAB
BILIRUBIN URINE: NEGATIVE
BLOOD, URINE: NEGATIVE
CHARACTER, URINE: CLEAR
COLOR: YELLOW
GLUCOSE URINE: NEGATIVE MG/DL
KETONES, URINE: NEGATIVE
LEUKOCYTE ESTERASE, URINE: ABNORMAL
NITRITE, URINE: NEGATIVE
PH UA: 5.5 (ref 5–9)
PROTEIN UA: 30 MG/DL
SPECIFIC GRAVITY, URINE: 1.02 (ref 1–1.03)
UROBILINOGEN, URINE: 1 EU/DL (ref 0.2–1)

## 2021-07-31 PROCEDURE — 87070 CULTURE OTHR SPECIMN AEROBIC: CPT

## 2021-07-31 PROCEDURE — 87086 URINE CULTURE/COLONY COUNT: CPT

## 2021-07-31 PROCEDURE — 87205 SMEAR GRAM STAIN: CPT

## 2021-07-31 PROCEDURE — 81003 URINALYSIS AUTO W/O SCOPE: CPT

## 2021-07-31 PROCEDURE — 99213 OFFICE O/P EST LOW 20 MIN: CPT | Performed by: NURSE PRACTITIONER

## 2021-07-31 PROCEDURE — 99213 OFFICE O/P EST LOW 20 MIN: CPT

## 2021-07-31 RX ORDER — SULFAMETHOXAZOLE AND TRIMETHOPRIM 800; 160 MG/1; MG/1
1 TABLET ORAL 2 TIMES DAILY
Qty: 6 TABLET | Refills: 0 | Status: SHIPPED | OUTPATIENT
Start: 2021-07-31 | End: 2021-08-03

## 2021-07-31 RX ORDER — FLUCONAZOLE 150 MG/1
150 TABLET ORAL
Qty: 2 TABLET | Refills: 0 | Status: SHIPPED | OUTPATIENT
Start: 2021-07-31 | End: 2021-08-06

## 2021-07-31 ASSESSMENT — ENCOUNTER SYMPTOMS
SHORTNESS OF BREATH: 0
COUGH: 0
NAUSEA: 0
VOMITING: 0

## 2021-07-31 NOTE — ED PROVIDER NOTES
[address]  Urgent Care Encounter       CHIEF COMPLAINT       Chief Complaint   Patient presents with    Vaginal Itching       Nurses Notes reviewed and I agree except as noted in the HPI. HISTORY OF PRESENT ILLNESS   Sanjeev Cooper is a 44 y.o. female who presents for evaluation of dysuria and vaginal itching has been ongoing for the past 2 days. Patient denies any vaginal discharge or odor. She denies any concerns for STD. She denies any fever, chills, nausea, or vomiting. The history is provided by the patient. REVIEW OF SYSTEMS     Review of Systems   Constitutional: Negative for chills and fever. Respiratory: Negative for cough and shortness of breath. Cardiovascular: Negative for chest pain. Gastrointestinal: Negative for nausea and vomiting. Genitourinary: Positive for dysuria. Negative for frequency, vaginal discharge and vaginal pain (itching). Musculoskeletal: Negative for arthralgias and myalgias. Skin: Negative for rash. Neurological: Negative for headaches. PAST MEDICAL HISTORY         Diagnosis Date    CAD (coronary artery disease)     Depression     Diabetes mellitus ([facility] [address]     Headache     Hyperlipidemia        SURGICALHISTORY     Patient  has a past surgical history that includes Coronary angioplasty with stent and Tubal ligation.     CURRENT MEDICATIONS       Previous Medications    ASPIRIN 81 MG CHEWABLE TABLET    Take 81 mg by mouth daily    ATORVASTATIN (LIPITOR) 40 MG TABLET    Take 1 tablet by mouth daily    BLOOD GLUCOSE MONITORING SUPPL (ACURA BLOOD GLUCOSE METER) W/DEVICE KIT    1 Device by Does not apply route three times daily 1 METER BRAND INSURANCE WILL COVER    BLOOD GLUCOSE TEST STRIPS (GLUCOSE METER TEST) STRIP    1 each by In Vitro route 3 times daily    CALCIUM CARB-CHOLECALCIFEROL 600-500 MG-UNIT CAPS    Take 1 tablet by mouth 2 times daily    INSULIN PEN NEEDLE 31G X 5 MM MISC    1 each by Does not apply route daily KETOROLAC (TORADOL) 10 MG TABLET    Take 1 tablet by mouth every 8 hours as needed for Pain    LANCETS MISC    Use 3 times daily to check blood sugar DX:E11.9    LEVEMIR FLEXTOUCH 100 UNIT/ML INJECTION PEN    INJECT 18 UNITS SUBCUTANEOUSLY NIGHTLY    METFORMIN (GLUCOPHAGE) 1000 MG TABLET    TAKE 1 TABLET TWICE DAILY WITH MEALS    MULTIPLE VITAMINS-MINERALS (THERAPEUTIC MULTIVITAMIN-MINERALS) TABLET    Take 1 tablet by mouth daily    SERTRALINE (ZOLOFT) 50 MG TABLET    Take 50 mg by mouth daily       ALLERGIES     Patient is is allergic to keflex [cephalexin]. Patients   Immunization History   Administered Date(s) Administered    Influenza Virus Vaccine 10/25/2017, 09/30/2018, 11/13/2019, 09/05/2020    Influenza, Rachel Spies, IM, (6 mo and older Fluzone, Flulaval, Fluarix and 3 yrs and older Afluria) 11/13/2019       FAMILY HISTORY     Patient's family history includes Cancer in her father and mother; Diabetes in her father and mother; Heart Disease in her father and mother. SOCIAL HISTORY     Patient  reports that she has never smoked. She has never used smokeless tobacco. She reports that she does not drink alcohol and does not use drugs. PHYSICAL EXAM     ED TRIAGE VITALS  BP: 139/62, Temp: 97.8 °F (36.6 °C), Pulse: 89, Resp: 19, SpO2: 97 %,Estimated body mass index is 27.44 kg/m² as calculated from the following:    Height as of 6/5/21: 5' 2\" (1.575 m). Weight as of 1/13/21: 150 lb (68 kg). ,No LMP recorded. Patient is perimenopausal.    Physical Exam  Vitals and nursing note reviewed. Constitutional:       General: She is not in acute distress. Appearance: She is well-developed. She is not diaphoretic. Eyes:      Conjunctiva/sclera:      Right eye: Right conjunctiva is not injected. Left eye: Left conjunctiva is not injected. Pupils: Pupils are equal.   Cardiovascular:      Rate and Rhythm: Normal rate and regular rhythm. Heart sounds: No murmur heard.      Pulmonary:      Effort: Pulmonary effort is normal. No respiratory distress. Breath sounds: Normal breath sounds. Abdominal:      General: Bowel sounds are normal.      Palpations: Abdomen is soft. Tenderness: There is no abdominal tenderness. There is no right CVA tenderness or left CVA tenderness. Genitourinary:     Comments: Exam deferred  Musculoskeletal:      Cervical back: Normal range of motion. Right knee: Normal range of motion. Left knee: Normal range of motion. Skin:     General: Skin is warm. Findings: No rash. Neurological:      Mental Status: She is alert and oriented to person, place, and time. Psychiatric:         Behavior: Behavior normal.         DIAGNOSTIC RESULTS     Labs:  Results for orders placed or performed during the hospital encounter of 07/31/21   Urinalysis   Result Value Ref Range    Glucose, Ur Negative NEGATIVE mg/dl    Bilirubin Urine Negative NEGATIVE    Ketones, Urine Negative NEGATIVE    Specific Gravity, Urine 1.020 1.002 - 1.030    Blood, Urine Negative NEGATIVE    pH, UA 5.50 5.0 - 9.0    Protein, UA 30 (A) NEGATIVE mg/dl    Urobilinogen, Urine 1.00 0.2 - 1.0 eu/dl    Nitrite, Urine Negative NEGATIVE    Leukocyte Esterase, Urine Small (A) NEGATIVE    Color, UA Yellow STRAW-YELLOW    Character, Urine Clear CLEAR-SL CLOUD       IMAGING:    No orders to display         EKG:  none    URGENT CARE COURSE:     Vitals:    07/31/21 1110   BP: 139/62   Pulse: 89   Resp: 19   Temp: 97.8 °F (36.6 °C)   SpO2: 97%       Medications - No data to display         PROCEDURES:  None    FINAL IMPRESSION      1. Dysuria    2. Vaginal itching          DISPOSITION/ PLAN       Urine sample does demonstrate a small amount of leukocytes. I discussed with the patient that I believe her symptoms are likely related to a vaginal yeast infection, however she will also be treated for a possible early UTI versus urethritis with a 3-day course of Bactrim.   She is given Diflucan for her yeast infection and is advised that she will be notified of any results from the urine culture and genital culture. She is agreeable to plan as discussed and will follow-up on an outpatient basis as needed.     PATIENT REFERRED TO:  DO Mirian Bradley [address] / [place] [id number]      DISCHARGE MEDICATIONS:  New Prescriptions    FLUCONAZOLE (DIFLUCAN) 150 MG TABLET    Take 1 tablet by mouth every 72 hours for 6 days    SULFAMETHOXAZOLE-TRIMETHOPRIM (BACTRIM DS) 800-160 MG PER TABLET    Take 1 tablet by mouth 2 times daily for 3 days       Discontinued Medications    No medications on file       Current Discharge Medication List          MIRIAN Nam CNP    (Please note that portions of this note were completed with a voice recognition program. Efforts were made to edit the dictations but occasionally words are mis-transcribed.)          MIRIAN Nam CNP  07/31/21 9950

## 2021-08-01 LAB
ORGANISM: ABNORMAL
URINE CULTURE, ROUTINE: ABNORMAL

## 2021-08-03 LAB
GENITAL CULTURE, ROUTINE: NORMAL
GRAM STAIN RESULT: NORMAL

## 2021-08-30 DIAGNOSIS — E11.9 TYPE 2 DIABETES MELLITUS WITHOUT COMPLICATION, UNSPECIFIED WHETHER LONG TERM INSULIN USE (HCC): ICD-10-CM

## 2021-08-30 RX ORDER — INSULIN DETEMIR 100 [IU]/ML
INJECTION, SOLUTION SUBCUTANEOUS
Qty: 30 ML | Refills: 0 | Status: SHIPPED | OUTPATIENT
Start: 2021-08-30 | End: 2021-11-11

## 2021-10-18 ENCOUNTER — HOSPITAL ENCOUNTER (EMERGENCY)
Age: 51
Discharge: HOME | End: 2021-10-18
Payer: MEDICARE

## 2021-10-18 VITALS
HEART RATE: 93 BPM | OXYGEN SATURATION: 99 % | SYSTOLIC BLOOD PRESSURE: 144 MMHG | DIASTOLIC BLOOD PRESSURE: 68 MMHG | RESPIRATION RATE: 18 BRPM | TEMPERATURE: 97 F

## 2021-10-18 DIAGNOSIS — L03.113 CELLULITIS OF RIGHT HAND: Primary | ICD-10-CM

## 2021-10-18 PROCEDURE — 99213 OFFICE O/P EST LOW 20 MIN: CPT | Performed by: NURSE PRACTITIONER

## 2021-10-18 PROCEDURE — 99213 OFFICE O/P EST LOW 20 MIN: CPT

## 2021-10-18 RX ORDER — CLINDAMYCIN HYDROCHLORIDE 300 MG/1
300 CAPSULE ORAL 3 TIMES DAILY
Qty: 30 CAPSULE | Refills: 0 | Status: SHIPPED | OUTPATIENT
Start: 2021-10-18 | End: 2021-10-28

## 2021-10-18 RX ORDER — GREEN TEA/HOODIA GORDONII 315-12.5MG
1 CAPSULE ORAL 2 TIMES DAILY
Qty: 60 TABLET | Refills: 0 | Status: SHIPPED | OUTPATIENT
Start: 2021-10-18 | End: 2021-11-17

## 2021-10-18 ASSESSMENT — PAIN DESCRIPTION - ORIENTATION: ORIENTATION: RIGHT

## 2021-10-18 ASSESSMENT — PAIN DESCRIPTION - DESCRIPTORS: DESCRIPTORS: SORE;HEAVINESS

## 2021-10-18 ASSESSMENT — PAIN DESCRIPTION - PAIN TYPE: TYPE: ACUTE PAIN

## 2021-10-18 ASSESSMENT — ENCOUNTER SYMPTOMS
VOMITING: 0
NAUSEA: 0

## 2021-10-18 ASSESSMENT — PAIN DESCRIPTION - LOCATION: LOCATION: HAND

## 2021-10-18 ASSESSMENT — PAIN SCALES - GENERAL: PAINLEVEL_OUTOF10: 9

## 2021-10-18 ASSESSMENT — PAIN DESCRIPTION - FREQUENCY: FREQUENCY: CONTINUOUS

## 2021-10-18 NOTE — ED PROVIDER NOTES
[address]  Urgent Care Encounter       CHIEF COMPLAINT       Chief Complaint   Patient presents with    Hand Pain     right hand pain, red and warm to touch onset Saturday        Nurses Notes reviewed and I agree except as noted in the HPI. HISTORY OF PRESENT ILLNESS   Sherman Manning is a 44 y.o. female who presents with complaints of pain, swelling and redness to the right hand onset 2 days ago. Patient denies any injury to the hand. She woke up Saturday morning with the pain and it has become progressively worse. No fever, chills, nausea or vomiting. She does report increased pain with using the hand and decreased range of motion due to swelling. The history is provided by the patient. REVIEW OF SYSTEMS     Review of Systems   Constitutional: Negative for fever. Gastrointestinal: Negative for nausea and vomiting. Musculoskeletal:        Right hand pain, swelling and redness   Skin: Negative for wound. Neurological: Negative for numbness. PAST MEDICAL HISTORY         Diagnosis Date    CAD (coronary artery disease)     Depression     Diabetes mellitus ([facility] [address]     Headache     Hyperlipidemia        SURGICALHISTORY     Patient  has a past surgical history that includes Coronary angioplasty with stent and Tubal ligation.     CURRENT MEDICATIONS       Discharge Medication List as of 10/18/2021  7:58 PM      CONTINUE these medications which have NOT CHANGED    Details   LEVEMIR FLEXTOUCH 100 UNIT/ML injection pen INJECT 18 UNITS SUBCUTANEOUSLY NIGHTLY, Disp-30 mL, R-0Normal      ketorolac (TORADOL) 10 MG tablet Take 1 tablet by mouth every 8 hours as needed for Pain, Disp-15 tablet, R-0Normal      metFORMIN (GLUCOPHAGE) 1000 MG tablet TAKE 1 TABLET TWICE DAILY WITH MEALS, Disp-60 tablet, R-3Normal      sertraline (ZOLOFT) 50 MG tablet Take 50 mg by mouth dailyHistorical Med      Lancets MISC Disp-100 each,R-3, NormalUse 3 times daily to check blood sugar DX:E11.9 Insulin Pen Needle 31G X 5 MM MISC DAILY Starting Thu 9/24/2020, Disp-100 each,R-3, Normal      blood glucose test strips (GLUCOSE METER TEST) strip 1 each by In Vitro route 3 times daily, Disp-300 each,R-5BRAND INSURANCE WILL COVERNormal      Blood Glucose Monitoring Suppl (ACURA BLOOD GLUCOSE METER) w/Device KIT 3 TIMES DAILY Starting Thu 9/24/2020, Disp-1 kit,R-0, Normal1 METER BRAND INSURANCE WILL COVER      Calcium Carb-Cholecalciferol 600-500 MG-UNIT CAPS Take 1 tablet by mouth 2 times dailyHistorical Med      atorvastatin (LIPITOR) 40 MG tablet Take 1 tablet by mouth daily, Disp-30 tablet, R-5Normal      aspirin 81 MG chewable tablet Take 81 mg by mouth dailyHistorical Med      Multiple Vitamins-Minerals (THERAPEUTIC MULTIVITAMIN-MINERALS) tablet Take 1 tablet by mouth dailyHistorical Med             ALLERGIES     Patient is is allergic to keflex [cephalexin]. Patients   Immunization History   Administered Date(s) Administered    Influenza Virus Vaccine 10/25/2017, 09/30/2018, 11/13/2019, 09/05/2020    Influenza, ARLEEN Martinez, (6 mo and older Fluzone, Flulaval, Fluarix and 3 yrs and older Afluria) 11/13/2019       FAMILY HISTORY     Patient's family history includes Cancer in her father and mother; Diabetes in her father and mother; Heart Disease in her father and mother. SOCIAL HISTORY     Patient  reports that she has never smoked. She has never used smokeless tobacco. She reports that she does not drink alcohol and does not use drugs. PHYSICAL EXAM     ED TRIAGE VITALS  BP: (!) 144/68, Temp: 97 °F (36.1 °C), Pulse: 93, Resp: 18, SpO2: 99 %,Estimated body mass index is 27.44 kg/m² as calculated from the following:    Height as of 6/5/21: 5' 2\" (1.575 m). Weight as of 1/13/21: 150 lb (68 kg). ,No LMP recorded. Patient is perimenopausal.    Physical Exam  Vitals and nursing note reviewed. Constitutional:       Appearance: She is well-developed. HENT:      Head: Normocephalic and atraumatic. Right Ear: Hearing, tympanic membrane, ear canal and external ear normal.      Left Ear: Hearing, tympanic membrane, ear canal and external ear normal.   Cardiovascular:      Rate and Rhythm: Normal rate and regular rhythm. Heart sounds: Normal heart sounds. Pulmonary:      Effort: Pulmonary effort is normal. No respiratory distress. Breath sounds: Normal breath sounds. No wheezing. Musculoskeletal:      Right hand: Swelling (With redness and warmth to the lateral dorsum of the hand) and tenderness present. Decreased range of motion. Hands:       Cervical back: Neck supple. Lymphadenopathy:      Head:      Right side of head: No submental, submandibular, tonsillar, preauricular, posterior auricular or occipital adenopathy. Left side of head: No submental, submandibular, tonsillar, preauricular, posterior auricular or occipital adenopathy. Cervical: No cervical adenopathy. Skin:     General: Skin is warm and dry. Neurological:      Mental Status: She is alert and oriented to person, place, and time. Psychiatric:         Speech: Speech normal.         Behavior: Behavior normal. Behavior is cooperative. DIAGNOSTIC RESULTS     Labs:No results found for this visit on 10/18/21. IMAGING:    No orders to display         EKG:      URGENT CARE COURSE:     Vitals:    10/18/21 1925   BP: (!) 144/68   Pulse: 93   Resp: 18   Temp: 97 °F (36.1 °C)   TempSrc: Temporal   SpO2: 99%       Medications - No data to display         PROCEDURES:  None    FINAL IMPRESSION      1. Cellulitis of right hand          DISPOSITION/ PLAN     Patient presents with cellulitis of the right hand. We will treat with clindamycin patient also instructed to take a probiotic daily. Tylenol and/or Motrin for pain/fever. Follow-up with family doctor return to urgent care in 2 to 3 days if not improved with treatment. ER for worsening condition as discussed.   Further instructions were outlined verbally and in the patient's discharge instructions. All the patient's questions were answered. The patient/parent agreed with the plan and was discharged from the [facility] in good condition.       PATIENT REFERRED TO:  DO Mirian Salvador Professional  / Luis Manuel Gutierrez [address] [place] [address]:  Discharge Medication List as of 10/18/2021  7:58 PM      START taking these medications    Details   clindamycin (CLEOCIN) 300 MG capsule Take 1 capsule by mouth 3 times daily for 10 days, Disp-30 capsule, R-0Normal      Probiotic Acidophilus (FLORANEX) TABS Take 1 tablet by mouth 2 times daily, Disp-60 tablet, R-0Normal             Discharge Medication List as of 10/18/2021  7:58 PM          Discharge Medication List as of 10/18/2021  7:58 PM          MIRIAN Reyes CNP    (Please note that portions of this note were completed with a voice recognition program. Efforts were made to edit the dictations but occasionally words are mis-transcribed.)         MIRIAN Reyes CNP  10/18/21 2047

## 2021-10-19 NOTE — ED NOTES
Discharge instructions and prescriptions reviewed with pt. Pt verbalized understanding. Pt ambulated out in stable condition. Assessment unchanged upon discharge.      Esha Spring RN  10/18/21 2003

## 2021-11-11 DIAGNOSIS — E11.9 TYPE 2 DIABETES MELLITUS WITHOUT COMPLICATION, UNSPECIFIED WHETHER LONG TERM INSULIN USE (HCC): ICD-10-CM

## 2021-11-11 RX ORDER — INSULIN DETEMIR 100 [IU]/ML
INJECTION, SOLUTION SUBCUTANEOUS
Qty: 30 ML | Refills: 0 | Status: SHIPPED | OUTPATIENT
Start: 2021-11-11 | End: 2022-01-24

## 2021-12-24 ENCOUNTER — HOSPITAL ENCOUNTER (EMERGENCY)
Age: 51
Discharge: HOME | End: 2021-12-24
Payer: MEDICARE

## 2021-12-24 VITALS
HEART RATE: 90 BPM | TEMPERATURE: 97.8 F | SYSTOLIC BLOOD PRESSURE: 164 MMHG | OXYGEN SATURATION: 98 % | RESPIRATION RATE: 20 BRPM | DIASTOLIC BLOOD PRESSURE: 89 MMHG

## 2021-12-24 DIAGNOSIS — S76.211A INGUINAL STRAIN, RIGHT, INITIAL ENCOUNTER: Primary | ICD-10-CM

## 2021-12-24 PROCEDURE — 99213 OFFICE O/P EST LOW 20 MIN: CPT

## 2021-12-24 PROCEDURE — 99213 OFFICE O/P EST LOW 20 MIN: CPT | Performed by: NURSE PRACTITIONER

## 2021-12-24 RX ORDER — LIDOCAINE 50 MG/G
OINTMENT TOPICAL
Qty: 50 G | Refills: 0 | Status: SHIPPED | OUTPATIENT
Start: 2021-12-24

## 2021-12-24 RX ORDER — NAPROXEN 500 MG/1
500 TABLET ORAL 2 TIMES DAILY WITH MEALS
Qty: 60 TABLET | Refills: 0 | Status: SHIPPED | OUTPATIENT
Start: 2021-12-24 | End: 2022-03-04 | Stop reason: ALTCHOICE

## 2021-12-24 ASSESSMENT — ENCOUNTER SYMPTOMS
NAUSEA: 0
SHORTNESS OF BREATH: 0
VOMITING: 0

## 2021-12-24 NOTE — ED TRIAGE NOTES
Pt presents to  with c/o right leg pain in groin area. Pt reports she has varicose veins and needs compression socks to help. Pt denies injury or Hx of blood clots.

## 2021-12-24 NOTE — ED PROVIDER NOTES
[address]  Urgent Care Encounter       CHIEF COMPLAINT       Chief Complaint   Patient presents with    Leg Pain       Nurses Notes reviewed and I agree except as noted in the HPI. HISTORY OF PRESENT ILLNESS   Georgianna Goodell is a 46 y.o. female who presents for evaluation of right groin/upper leg pain. Patient states that this began last night into this morning. She denies any swelling or redness to the area. She denies any injury or trauma. States that she has had similar type pain on the left side in the past.  She denies any numbness or tingling to the lower extremity or any other concerns. The history is provided by the patient. REVIEW OF SYSTEMS     Review of Systems   Constitutional: Negative for chills and fever. Respiratory: Negative for shortness of breath. Cardiovascular: Negative for chest pain. Gastrointestinal: Negative for nausea and vomiting. Musculoskeletal: Positive for myalgias. Negative for arthralgias, gait problem and joint swelling. Skin: Negative for rash. Neurological: Negative for weakness and numbness. Hematological: Does not bruise/bleed easily. PAST MEDICAL HISTORY         Diagnosis Date    CAD (coronary artery disease)     Depression     Diabetes mellitus ([facility] [address]     Headache     Hyperlipidemia        SURGICALHISTORY     Patient  has a past surgical history that includes Coronary angioplasty with stent and Tubal ligation.     CURRENT MEDICATIONS       Previous Medications    ASPIRIN 81 MG CHEWABLE TABLET    Take 81 mg by mouth daily    ATORVASTATIN (LIPITOR) 40 MG TABLET    Take 1 tablet by mouth daily    BLOOD GLUCOSE MONITORING SUPPL (ACURA BLOOD GLUCOSE METER) W/DEVICE KIT    1 Device by Does not apply route three times daily 1 METER BRAND INSURANCE WILL COVER    BLOOD GLUCOSE TEST STRIPS (GLUCOSE METER TEST) STRIP    1 each by In Vitro route 3 times daily    CALCIUM CARB-CHOLECALCIFEROL 600-500 MG-UNIT CAPS    Take 1 tablet by mouth 2 times daily    INSULIN PEN NEEDLE 31G X 5 MM MISC    1 each by Does not apply route daily    KETOROLAC (TORADOL) 10 MG TABLET    Take 1 tablet by mouth every 8 hours as needed for Pain    LANCETS MISC    Use 3 times daily to check blood sugar DX:E11.9    LEVEMIR FLEXTOUCH 100 UNIT/ML INJECTION PEN    INJECT 18 UNITS SUBCUTANEOUSLY NIGHTLY    METFORMIN (GLUCOPHAGE) 1000 MG TABLET    TAKE 1 TABLET TWICE DAILY WITH MEALS    MULTIPLE VITAMINS-MINERALS (THERAPEUTIC MULTIVITAMIN-MINERALS) TABLET    Take 1 tablet by mouth daily    SERTRALINE (ZOLOFT) 50 MG TABLET    Take 50 mg by mouth daily       ALLERGIES     Patient is is allergic to keflex [cephalexin]. Patients   Immunization History   Administered Date(s) Administered    Influenza Virus Vaccine 10/25/2017, 09/30/2018, 11/13/2019, 09/05/2020    Influenza, Gunjan Mina, IM, (6 mo and older Fluzone, Flulaval, Fluarix and 3 yrs and older Afluria) 11/13/2019       FAMILY HISTORY     Patient's family history includes Cancer in her father and mother; Diabetes in her father and mother; Heart Disease in her father and mother. SOCIAL HISTORY     Patient  reports that she has never smoked. She has never used smokeless tobacco. She reports that she does not drink alcohol and does not use drugs. PHYSICAL EXAM     ED TRIAGE VITALS  BP: (!) 164/89, Temp: 97.8 °F (36.6 °C), Pulse: 90, Resp: 20, SpO2: 98 %,Estimated body mass index is 27.44 kg/m² as calculated from the following:    Height as of 6/5/21: 5' 2\" (1.575 m). Weight as of 1/13/21: 150 lb (68 kg). ,No LMP recorded. Patient is perimenopausal.    Physical Exam  Vitals and nursing note reviewed. Constitutional:       General: She is not in acute distress. Appearance: She is well-developed. She is not diaphoretic. Eyes:      Conjunctiva/sclera:      Right eye: Right conjunctiva is not injected. Left eye: Left conjunctiva is not injected.       Pupils: Pupils are equal.   Cardiovascular: Rate and Rhythm: Normal rate and regular rhythm. Heart sounds: No murmur heard. Pulmonary:      Effort: Pulmonary effort is normal. No respiratory distress. Breath sounds: Normal breath sounds. Musculoskeletal:      Cervical back: Normal range of motion. Right knee: Normal range of motion. Left knee: Normal range of motion. Comments: Tenderness noted to palpation in the groin area. No swelling or erythema or bruising is noted in the groin area   Lymphadenopathy:      Lower Body: No right inguinal adenopathy. Skin:     General: Skin is warm. Findings: No rash. Neurological:      Mental Status: She is alert and oriented to person, place, and time. Psychiatric:         Behavior: Behavior normal.         DIAGNOSTIC RESULTS     Labs:No results found for this visit on 12/24/21. IMAGING:    No orders to display         EKG:      URGENT CARE COURSE:     Vitals:    12/24/21 1229   BP: (!) 164/89   Pulse: 90   Resp: 20   Temp: 97.8 °F (36.6 °C)   SpO2: 98%       Medications - No data to display         PROCEDURES:  None    FINAL IMPRESSION      1. Inguinal strain, right, initial encounter          DISPOSITION/ PLAN       Exam is consistent with a groin strain. I discussed with the patient to rest at home and she is given a prescription for Naprosyn and lidocaine ointment. She is advised to follow-up if her symptoms do not improve or worsen and she is agreeable to plan as discussed. PATIENT REFERRED TO:  [facility] [address] / [place] [id number]      DISCHARGE MEDICATIONS:  New Prescriptions    LIDOCAINE (XYLOCAINE) 5 % OINTMENT    Apply topically as needed.     NAPROXEN (NAPROSYN) 500 MG TABLET    Take 1 tablet by mouth 2 times daily (with meals)       Discontinued Medications    No medications on file       Current Discharge Medication List          MIRIAN Torres - CNP    (Please note that portions of this note were completed with a voice

## 2022-01-24 DIAGNOSIS — E11.9 TYPE 2 DIABETES MELLITUS WITHOUT COMPLICATION, UNSPECIFIED WHETHER LONG TERM INSULIN USE (HCC): ICD-10-CM

## 2022-01-24 RX ORDER — INSULIN DETEMIR 100 [IU]/ML
INJECTION, SOLUTION SUBCUTANEOUS
Qty: 30 ML | Refills: 0 | Status: SHIPPED | OUTPATIENT
Start: 2022-01-24

## 2022-01-24 RX ORDER — INSULIN DETEMIR 100 [IU]/ML
INJECTION, SOLUTION SUBCUTANEOUS
Qty: 30 ML | Refills: 0 | Status: SHIPPED | OUTPATIENT
Start: 2022-01-24 | End: 2022-01-24 | Stop reason: SDUPTHER

## 2022-03-04 ENCOUNTER — HOSPITAL ENCOUNTER (EMERGENCY)
Age: 52
Discharge: HOME | End: 2022-03-04
Payer: MEDICARE

## 2022-03-04 VITALS
RESPIRATION RATE: 16 BRPM | TEMPERATURE: 98.4 F | OXYGEN SATURATION: 95 % | HEART RATE: 100 BPM | SYSTOLIC BLOOD PRESSURE: 185 MMHG | DIASTOLIC BLOOD PRESSURE: 74 MMHG

## 2022-03-04 DIAGNOSIS — S39.012A STRAIN OF MUSCLE, FASCIA AND TENDON OF LOWER BACK, INITIAL ENCOUNTER: Primary | ICD-10-CM

## 2022-03-04 PROCEDURE — 99213 OFFICE O/P EST LOW 20 MIN: CPT

## 2022-03-04 PROCEDURE — 99213 OFFICE O/P EST LOW 20 MIN: CPT | Performed by: NURSE PRACTITIONER

## 2022-03-04 RX ORDER — KETOROLAC TROMETHAMINE 30 MG/ML
60 INJECTION, SOLUTION INTRAMUSCULAR; INTRAVENOUS ONCE
Status: DISCONTINUED | OUTPATIENT
Start: 2022-03-04 | End: 2022-03-04

## 2022-03-04 RX ORDER — IBUPROFEN 600 MG/1
600 TABLET ORAL EVERY 6 HOURS PRN
Qty: 40 TABLET | Refills: 1 | Status: SHIPPED | OUTPATIENT
Start: 2022-03-04

## 2022-03-04 ASSESSMENT — PAIN - FUNCTIONAL ASSESSMENT
PAIN_FUNCTIONAL_ASSESSMENT: PREVENTS OR INTERFERES WITH MANY ACTIVE NOT PASSIVE ACTIVITIES
PAIN_FUNCTIONAL_ASSESSMENT: 0-10

## 2022-03-04 ASSESSMENT — PAIN DESCRIPTION - PROGRESSION: CLINICAL_PROGRESSION: GRADUALLY WORSENING

## 2022-03-04 ASSESSMENT — PAIN DESCRIPTION - LOCATION: LOCATION: BACK

## 2022-03-04 ASSESSMENT — PAIN DESCRIPTION - ORIENTATION: ORIENTATION: LEFT

## 2022-03-04 ASSESSMENT — ENCOUNTER SYMPTOMS
VOMITING: 0
BACK PAIN: 1
NAUSEA: 0

## 2022-03-04 ASSESSMENT — PAIN DESCRIPTION - DESCRIPTORS: DESCRIPTORS: TENDER;OTHER (COMMENT)

## 2022-03-04 ASSESSMENT — PAIN DESCRIPTION - PAIN TYPE: TYPE: ACUTE PAIN

## 2022-03-04 ASSESSMENT — PAIN DESCRIPTION - ONSET: ONSET: GRADUAL

## 2022-03-04 ASSESSMENT — PAIN SCALES - GENERAL: PAINLEVEL_OUTOF10: 7

## 2022-03-04 ASSESSMENT — PAIN DESCRIPTION - FREQUENCY: FREQUENCY: INTERMITTENT

## 2022-03-04 NOTE — ED PROVIDER NOTES
[address]  Urgent Care Encounter       CHIEF COMPLAINT       Chief Complaint   Patient presents with    Back Pain     left side and lower back       Nurses Notes reviewed and I agree except as noted in the HPI. HISTORY OF PRESENT ILLNESS   Jaime Tai is a 46 y.o. female who presents patient presents with complaints of the left side and left lower back pain, onset 3 days ago. Patient states she was moving her bed at her house when the injury occurred. She feels the pain mostly with raising her left arm or with twisting and sometimes bending. Pain is \"not that bad. \"  She has taken Tylenol for the pain. The history is provided by the patient. REVIEW OF SYSTEMS     Review of Systems   Constitutional: Negative for fever. Gastrointestinal: Negative for nausea and vomiting. Musculoskeletal: Positive for back pain. Skin: Negative for wound. Neurological: Negative for numbness. PAST MEDICAL HISTORY         Diagnosis Date    CAD (coronary artery disease)     Depression     Diabetes mellitus ([facility] [address]     Headache     Hyperlipidemia        SURGICALHISTORY     Patient  has a past surgical history that includes Coronary angioplasty with stent and Tubal ligation.     CURRENT MEDICATIONS       Previous Medications    ASPIRIN 81 MG CHEWABLE TABLET    Take 81 mg by mouth daily    ATORVASTATIN (LIPITOR) 40 MG TABLET    Take 1 tablet by mouth daily    BLOOD GLUCOSE MONITORING SUPPL (ACURA BLOOD GLUCOSE METER) W/DEVICE KIT    1 Device by Does not apply route three times daily 1 METER BRAND INSURANCE WILL COVER    BLOOD GLUCOSE TEST STRIPS (GLUCOSE METER TEST) STRIP    1 each by In Vitro route 3 times daily    CALCIUM CARB-CHOLECALCIFEROL 600-500 MG-UNIT CAPS    Take 1 tablet by mouth 2 times daily    INSULIN DETEMIR (LEVEMIR FLEXTOUCH) 100 UNIT/ML INJECTION PEN    INJECT 18 UNITS SUBCUTANEOUSLY NIGHTLY    INSULIN PEN NEEDLE 31G X 5 MM MISC    1 each by Does not apply route daily KETOROLAC (TORADOL) 10 MG TABLET    Take 1 tablet by mouth every 8 hours as needed for Pain    LANCETS MISC    Use 3 times daily to check blood sugar DX:E11.9    LIDOCAINE (XYLOCAINE) 5 % OINTMENT    Apply topically as needed. METFORMIN (GLUCOPHAGE) 1000 MG TABLET    TAKE 1 TABLET TWICE DAILY WITH MEALS    MULTIPLE VITAMINS-MINERALS (THERAPEUTIC MULTIVITAMIN-MINERALS) TABLET    Take 1 tablet by mouth daily    SERTRALINE (ZOLOFT) 50 MG TABLET    Take 50 mg by mouth daily       ALLERGIES     Patient is is allergic to keflex [cephalexin]. Patients   Immunization History   Administered Date(s) Administered    Influenza Virus Vaccine 10/25/2017, 09/30/2018, 11/13/2019, 09/05/2020    Influenza, Mouna Julio Césarars, IM, (6 mo and older Fluzone, Flulaval, Fluarix and 3 yrs and older Afluria) 11/13/2019       FAMILY HISTORY     Patient's family history includes Cancer in her father and mother; Diabetes in her father and mother; Heart Disease in her father and mother. SOCIAL HISTORY     Patient  reports that she has never smoked. She has never used smokeless tobacco. She reports that she does not drink alcohol and does not use drugs. PHYSICAL EXAM     ED TRIAGE VITALS  BP: (!) 185/74, Temp: 98.4 °F (36.9 °C), Pulse: 100, Resp: 16, SpO2: 95 %,Estimated body mass index is 27.44 kg/m² as calculated from the following:    Height as of 6/5/21: 5' 2\" (1.575 m). Weight as of 1/13/21: 150 lb (68 kg). ,No LMP recorded. Patient is perimenopausal.    Physical Exam  Vitals and nursing note reviewed. Constitutional:       General: She is not in acute distress. Appearance: She is well-developed. HENT:      Head: Normocephalic and atraumatic. Pulmonary:      Effort: Pulmonary effort is normal. No respiratory distress. Musculoskeletal:      Comments: Pain in the left upper lower back and flank area with stretching. Area is not tender to palpation. Skin:     General: Skin is warm and dry.    Neurological:      General: No focal deficit present. Mental Status: She is alert and oriented to person, place, and time. Psychiatric:         Mood and Affect: Mood normal.         Speech: Speech normal.         Behavior: Behavior normal. Behavior is cooperative. DIAGNOSTIC RESULTS     Labs:No results found for this visit on 03/04/22. IMAGING:    No orders to display         EKG:      URGENT CARE COURSE:     Vitals:    03/04/22 0905   BP: (!) 185/74   Pulse: 100   Resp: 16   Temp: 98.4 °F (36.9 °C)   TempSrc: Temporal   SpO2: 95%       Medications - No data to display         PROCEDURES:  None    FINAL IMPRESSION      1. Strain of muscle, fascia and tendon of lower back, initial encounter          DISPOSITION/ PLAN     Patient presents with the muscle strain to the left lower back left side. Pain is mild. Treat with ibuprofen, heat and gentle stretching. Avoid strenuous activity or heavy lifting until pain is improved. Patient also has lidocaine gel at home she can use if desired. Instructed not to use Toradol and ibuprofen at the same time. Patient verbalized understanding. Follow-up with family doctor in the next week if not improved. Further instructions were outlined verbally and in the patient's discharge instructions. All the patient's questions were answered. The patient/parent agreed with the plan and was discharged from the [facility] in good condition.     PATIENT REFERRED TO:  Mira Conteh DO  [address] / [place] [id number]      DISCHARGE MEDICATIONS:  New Prescriptions    IBUPROFEN (ADVIL;MOTRIN) 600 MG TABLET    Take 1 tablet by mouth every 6 hours as needed for Pain       Discontinued Medications    NAPROXEN (NAPROSYN) 500 MG TABLET    Take 1 tablet by mouth 2 times daily (with meals)       Current Discharge Medication List          Tiffanie Howard Case, APRN - CNP    (Please note that portions of this note were completed with a voice recognition program. Efforts were made to edit the dictations but occasionally words are mis-transcribed.)         Mikel Killer Case, APRN - CNP  03/04/22 7023

## 2022-07-20 ENCOUNTER — HOSPITAL ENCOUNTER (EMERGENCY)
Age: 52
Discharge: HOME | End: 2022-07-20
Payer: MEDICARE

## 2022-07-20 VITALS
BODY MASS INDEX: 28.71 KG/M2 | SYSTOLIC BLOOD PRESSURE: 113 MMHG | WEIGHT: 156 LBS | HEART RATE: 110 BPM | HEIGHT: 62 IN | DIASTOLIC BLOOD PRESSURE: 70 MMHG | TEMPERATURE: 98 F | OXYGEN SATURATION: 95 % | RESPIRATION RATE: 18 BRPM

## 2022-07-20 DIAGNOSIS — J06.9 VIRAL URI WITH COUGH: Primary | ICD-10-CM

## 2022-07-20 PROCEDURE — 99213 OFFICE O/P EST LOW 20 MIN: CPT

## 2022-07-20 PROCEDURE — 99213 OFFICE O/P EST LOW 20 MIN: CPT | Performed by: NURSE PRACTITIONER

## 2022-07-20 RX ORDER — DEXTROMETHORPHAN HYDROBROMIDE AND PROMETHAZINE HYDROCHLORIDE 15; 6.25 MG/5ML; MG/5ML
5 SYRUP ORAL 4 TIMES DAILY PRN
Qty: 120 ML | Refills: 0 | Status: SHIPPED | OUTPATIENT
Start: 2022-07-20 | End: 2022-07-27

## 2022-07-20 ASSESSMENT — ENCOUNTER SYMPTOMS
SHORTNESS OF BREATH: 0
DIARRHEA: 0
NAUSEA: 0
SORE THROAT: 0
RHINORRHEA: 0
VOMITING: 0
COUGH: 1
TROUBLE SWALLOWING: 0
EYE REDNESS: 0
EYE DISCHARGE: 0

## 2022-07-20 ASSESSMENT — PAIN - FUNCTIONAL ASSESSMENT
PAIN_FUNCTIONAL_ASSESSMENT: NONE - DENIES PAIN
PAIN_FUNCTIONAL_ASSESSMENT: NONE - DENIES PAIN

## 2022-07-20 NOTE — ED NOTES
Patient verbalized understanding of discharge instructions and medications prescribed. Denies questions or concerns at this time.       Elian Hernandez RN  07/20/22 5231

## 2022-07-20 NOTE — ED PROVIDER NOTES
[address]       Chief Complaint   Patient presents with    Cough       Nurses Notes reviewed and I agree except as noted in the HPI. HISTORY OF PRESENT ILLNESS   Bob Henderson is a 46 y.o. female who presents for evaluation of cough. Onset 4 days ago, unchanged. Cough is intermittent, dry. Denies fever, wheezing, shortness of breath. Cough is keeping her up at night. No travel. No exposure to strep, COVID, flu. No current treatment. REVIEW OF SYSTEMS     Review of Systems   Constitutional:  Negative for chills, diaphoresis, fatigue and fever. HENT:  Negative for congestion, ear pain, rhinorrhea, sore throat and trouble swallowing. Eyes:  Negative for discharge and redness. Respiratory:  Positive for cough. Negative for shortness of breath. Cardiovascular:  Negative for chest pain. Gastrointestinal:  Negative for diarrhea, nausea and vomiting. Genitourinary:  Negative for decreased urine volume. Musculoskeletal:  Negative for neck pain and neck stiffness. Skin:  Negative for rash. Neurological:  Negative for headaches. Hematological:  Negative for adenopathy. Psychiatric/Behavioral:  Negative for sleep disturbance. PAST MEDICAL HISTORY         Diagnosis Date    CAD (coronary artery disease)     Depression     Diabetes mellitus ([facility] [address]     Headache     Hyperlipidemia        SURGICAL HISTORY     Patient  has a past surgical history that includes Coronary angioplasty with stent and Tubal ligation.     CURRENT MEDICATIONS       Discharge Medication List as of 7/20/2022  5:15 PM        CONTINUE these medications which have NOT CHANGED    Details   ibuprofen (ADVIL;MOTRIN) 600 MG tablet Take 1 tablet by mouth every 6 hours as needed for Pain, Disp-40 tablet, R-1Normal      insulin detemir (LEVEMIR FLEXTOUCH) 100 UNIT/ML injection pen INJECT 18 UNITS SUBCUTANEOUSLY NIGHTLY, Disp-30 mL, R-0Normal      lidocaine (XYLOCAINE) 5 % ointment Apply topically as needed. , Disp-50 g, R-0, Normal      ketorolac (TORADOL) 10 MG tablet Take 1 tablet by mouth every 8 hours as needed for Pain, Disp-15 tablet, R-0Normal      metFORMIN (GLUCOPHAGE) 1000 MG tablet TAKE 1 TABLET TWICE DAILY WITH MEALS, Disp-60 tablet, R-3Normal      sertraline (ZOLOFT) 50 MG tablet Take 50 mg by mouth dailyHistorical Med      Lancets MISC Disp-100 each,R-3, NormalUse 3 times daily to check blood sugar DX:E11.9      Insulin Pen Needle 31G X 5 MM MISC DAILY Starting u 9/24/2020, Disp-100 each,R-3, Normal      blood glucose test strips (GLUCOSE METER TEST) strip 1 each by In Vitro route 3 times daily, Disp-300 each,R-5BRAND INSURANCE WILL COVERNormal      Blood Glucose Monitoring Suppl (ACURA BLOOD GLUCOSE METER) w/Device KIT 3 TIMES DAILY Starting Thu 9/24/2020, Disp-1 kit,R-0, Normal1 METER BRAND INSURANCE WILL COVER      Calcium Carb-Cholecalciferol 600-500 MG-UNIT CAPS Take 1 tablet by mouth 2 times dailyHistorical Med      atorvastatin (LIPITOR) 40 MG tablet Take 1 tablet by mouth daily, Disp-30 tablet, R-5Normal      aspirin 81 MG chewable tablet Take 81 mg by mouth dailyHistorical Med      Multiple Vitamins-Minerals (THERAPEUTIC MULTIVITAMIN-MINERALS) tablet Take 1 tablet by mouth dailyHistorical Med             ALLERGIES     Patient is is allergic to keflex [cephalexin]. FAMILY HISTORY     Patient'sfamily history includes Cancer in her father and mother; Diabetes in her father and mother; Heart Disease in her father and mother. SOCIAL HISTORY     Patient  reports that she has never smoked. She has never used smokeless tobacco. She reports that she does not drink alcohol and does not use drugs. PHYSICAL EXAM     ED TRIAGE VITALS  BP: 113/70, Temp: 98 °F (36.7 °C), Heart Rate: (!) 110, Resp: 18, SpO2: 95 %  Physical Exam  Vitals and nursing note reviewed. Constitutional:       General: She is not in acute distress. Appearance: Normal appearance.  She seconds. Coloration: Skin is not pale. Findings: No rash. Comments: Skin warm and dry to touch, no rashes noted on exposed surfaces. Neurological:      Mental Status: She is alert and oriented to person, place, and time. She is not disoriented. Psychiatric:         Mood and Affect: Mood normal.         Behavior: Behavior is cooperative. DIAGNOSTIC RESULTS   Labs: No results found for this visit on 07/20/22. IMAGING:  No orders to display     URGENT CARE COURSE:     Vitals:    07/20/22 1702   BP: 113/70   Pulse: (!) 110   Resp: 18   Temp: 98 °F (36.7 °C)   TempSrc: Temporal   SpO2: 95%   Weight: 156 lb (70.8 kg)   Height: 5' 2\" (1.575 m)       Medications - No data to display  PROCEDURES:  None  FINALIMPRESSION      1. Viral URI with cough        DISPOSITION/PLAN   DISPOSITION Decision To Discharge 07/20/2022 05:14:08 PM  Nontoxic, no distress. Exam consistent with viral URI with cough. Declined COVID testing. Medication as prescribed for nighttime cough suppression. Mucinex during the day. If symptoms worsen go to ER. PATIENT REFERRED TO:  DO Mir[facility] [address]  [phone]      Follow-up as needed. Medication as prescribed. Mucinex during daytime. Increase fluids. If symptoms worsen go to ER.   DISCHARGE MEDICATIONS:  Discharge Medication List as of 7/20/2022  5:15 PM        START taking these medications    Details   promethazine-dextromethorphan (PROMETHAZINE-DM) 6.25-15 MG/5ML syrup Take 5 mLs by mouth 4 times daily as needed for Cough, Disp-120 mL, R-0Normal           Discharge Medication List as of 7/20/2022  5:15 PM          MIRIAN Negrete - MIRIAN Avalos - THONY  07/20/22 4008

## 2023-07-20 ENCOUNTER — HOSPITAL ENCOUNTER (OUTPATIENT)
Age: 53
Discharge: HOME | End: 2023-07-20
Payer: MEDICARE

## 2023-07-20 LAB
ALBUMIN SERPL BCG-MCNC: 4.1 G/DL (ref 3.5–5.1)
ALP SERPL-CCNC: 126 U/L (ref 38–126)
ALT SERPL W/O P-5'-P-CCNC: 15 U/L (ref 11–66)
ANION GAP SERPL CALC-SCNC: 11 MEQ/L (ref 8–16)
AST SERPL-CCNC: 16 U/L (ref 5–40)
BILIRUB SERPL-MCNC: 0.8 MG/DL (ref 0.3–1.2)
BUN SERPL-MCNC: 9 MG/DL (ref 7–22)
CALCIUM SERPL-MCNC: 9.4 MG/DL (ref 8.5–10.5)
CHLORIDE SERPL-SCNC: 100 MEQ/L (ref 98–111)
CHOLEST SERPL-MCNC: 222 MG/DL (ref 100–199)
CO2 SERPL-SCNC: 26 MEQ/L (ref 23–33)
CREAT SERPL-MCNC: 0.5 MG/DL (ref 0.4–1.2)
GFR SERPL CREATININE-BSD FRML MDRD: > 60 ML/MIN/1.73M2
GLUCOSE SERPL-MCNC: 201 MG/DL (ref 70–108)
HDLC SERPL-MCNC: 73 MG/DL
LDLC SERPL CALC-MCNC: 131 MG/DL
MAGNESIUM SERPL-MCNC: 1.8 MG/DL (ref 1.6–2.4)
POTASSIUM SERPL-SCNC: 4.3 MEQ/L (ref 3.5–5.2)
PROT SERPL-MCNC: 8 G/DL (ref 6.1–8)
SODIUM SERPL-SCNC: 137 MEQ/L (ref 135–145)
TRIGL SERPL-MCNC: 90 MG/DL (ref 0–199)

## 2023-07-20 PROCEDURE — 80061 LIPID PANEL: CPT

## 2023-07-20 PROCEDURE — 83735 ASSAY OF MAGNESIUM: CPT

## 2023-07-20 PROCEDURE — 36415 COLL VENOUS BLD VENIPUNCTURE: CPT

## 2023-07-20 PROCEDURE — 80053 COMPREHEN METABOLIC PANEL: CPT

## 2024-03-22 ENCOUNTER — HOSPITAL ENCOUNTER (EMERGENCY)
Age: 54
Discharge: HOME | End: 2024-03-22
Payer: MEDICARE

## 2024-03-22 VITALS
DIASTOLIC BLOOD PRESSURE: 77 MMHG | RESPIRATION RATE: 16 BRPM | SYSTOLIC BLOOD PRESSURE: 136 MMHG | TEMPERATURE: 97.1 F | OXYGEN SATURATION: 100 % | BODY MASS INDEX: 31.28 KG/M2 | HEART RATE: 97 BPM | HEIGHT: 62 IN | WEIGHT: 170 LBS

## 2024-03-22 DIAGNOSIS — M43.6 TORTICOLLIS: Primary | ICD-10-CM

## 2024-03-22 PROCEDURE — 99213 OFFICE O/P EST LOW 20 MIN: CPT

## 2024-03-22 RX ORDER — LIDOCAINE 50 MG/G
1 PATCH TOPICAL DAILY
Qty: 10 PATCH | Refills: 0 | Status: SHIPPED | OUTPATIENT
Start: 2024-03-22 | End: 2024-04-01

## 2024-03-22 RX ORDER — CYCLOBENZAPRINE HCL 10 MG
10 TABLET ORAL 3 TIMES DAILY PRN
Qty: 21 TABLET | Refills: 0 | Status: SHIPPED | OUTPATIENT
Start: 2024-03-22 | End: 2024-04-01

## 2024-03-22 ASSESSMENT — PAIN DESCRIPTION - LOCATION: LOCATION: NECK

## 2024-03-22 ASSESSMENT — PAIN SCALES - GENERAL: PAINLEVEL_OUTOF10: 8

## 2024-03-22 ASSESSMENT — PAIN - FUNCTIONAL ASSESSMENT: PAIN_FUNCTIONAL_ASSESSMENT: 0-10

## 2024-03-22 ASSESSMENT — PAIN DESCRIPTION - ORIENTATION: ORIENTATION: RIGHT

## 2024-03-22 ASSESSMENT — PAIN DESCRIPTION - DESCRIPTORS: DESCRIPTORS: TIGHTNESS

## 2024-03-22 ASSESSMENT — PAIN DESCRIPTION - ONSET: ONSET: AWAKENED FROM SLEEP

## 2024-03-22 NOTE — ED PROVIDER NOTES
[facility]  Urgent Care Encounter       CHIEF COMPLAINT       Chief Complaint   Patient presents with    Torticollis     right       Nurses Notes reviewed and I agree except as noted in the HPI.  HISTORY OF PRESENT ILLNESS   Dione Lang is a 53 y.o. female who presents with complaints of right side neck pain. Pt reports sleeping wrong a few nights ago. Pt reports pain 8/10 at this time in right side of neck.     The history is provided by the patient.       REVIEW OF SYSTEMS     Review of Systems   Musculoskeletal:  Positive for neck pain.   All other systems reviewed and are negative.      PAST MEDICAL HISTORY         Diagnosis Date    CAD (coronary artery disease)     Depression     Diabetes mellitus (HCC)     Headache     Hyperlipidemia        SURGICALHISTORY     Patient  has a past surgical history that includes Coronary angioplasty with stent and Tubal ligation.    CURRENT MEDICATIONS       Previous Medications    ASPIRIN 81 MG CHEWABLE TABLET    Take 81 mg by mouth daily    ATORVASTATIN (LIPITOR) 40 MG TABLET    Take 1 tablet by mouth daily    BLOOD GLUCOSE MONITORING SUPPL (ACURA BLOOD GLUCOSE METER) W/DEVICE KIT    1 Device by Does not apply route three times daily 1 METER BRAND INSURANCE WILL COVER    BLOOD GLUCOSE TEST STRIPS (GLUCOSE METER TEST) STRIP    1 each by In Vitro route 3 times daily    CALCIUM CARB-CHOLECALCIFEROL 600-500 MG-UNIT CAPS    Take 1 tablet by mouth 2 times daily    IBUPROFEN (ADVIL;MOTRIN) 600 MG TABLET    Take 1 tablet by mouth every 6 hours as needed for Pain    INSULIN DETEMIR (LEVEMIR FLEXTOUCH) 100 UNIT/ML INJECTION PEN    INJECT 18 UNITS SUBCUTANEOUSLY NIGHTLY    INSULIN PEN NEEDLE 31G X 5 MM MISC    1 each by Does not apply route daily    KETOROLAC (TORADOL) 10 MG TABLET    Take 1 tablet by mouth every 8 hours as needed for Pain    LANCETS MISC    Use 3 times daily to check blood sugar DX:E11.9    METFORMIN (GLUCOPHAGE) 1000 MG TABLET    TAKE 1 TABLET  03/22/24.    IMAGING:    No orders to display         EKG:      URGENT CARE COURSE:     Vitals:    03/22/24 1259   BP: 136/77   Pulse: 97   Resp: 16   Temp: 97.1 °F (36.2 °C)   TempSrc: Temporal   SpO2: 100%   Weight: 77.1 kg (170 lb)   Height: 1.575 m (5' 2\")       Medications - No data to display         PROCEDURES:  None    FINAL IMPRESSION      1. Torticollis          DISPOSITION/ PLAN   Pt diagnosed with torticollis. Educated to take medication as needed for pain. Follow up with PCP. ER for worsening symptoms.         PATIENT REFERRED TO:  Erika Dey,   [address] / [place] [address]      DISCHARGE MEDICATIONS:  New Prescriptions    CYCLOBENZAPRINE (FLEXERIL) 10 MG TABLET    Take 1 tablet by mouth 3 times daily as needed for Muscle spasms    LIDOCAINE (LIDODERM) 5 %    Place 1 patch onto the skin daily for 10 days 12 hours on, 12 hours off.       Discontinued Medications    LIDOCAINE (XYLOCAINE) 5 % OINTMENT    Apply topically as needed.       Current Discharge Medication List          MIRIAN Stevens CNP    (Please note that portions of this note were completed with a voice recognition program. Efforts were made to edit the dictations but occasionally words are mis-transcribed.)            Jaimie Lopez APRN - CNP  03/22/24 1746

## 2024-04-09 ENCOUNTER — HOSPITAL ENCOUNTER (EMERGENCY)
Age: 54
Discharge: HOME | End: 2024-04-09
Payer: MEDICARE

## 2024-04-09 VITALS
DIASTOLIC BLOOD PRESSURE: 78 MMHG | HEIGHT: 62 IN | SYSTOLIC BLOOD PRESSURE: 142 MMHG | WEIGHT: 165 LBS | BODY MASS INDEX: 30.36 KG/M2 | HEART RATE: 100 BPM | RESPIRATION RATE: 16 BRPM | OXYGEN SATURATION: 96 % | TEMPERATURE: 97.4 F

## 2024-04-09 DIAGNOSIS — L60.8 DISCOLORATION OF NAIL: Primary | ICD-10-CM

## 2024-04-09 DIAGNOSIS — S90.212A SUBUNGUAL HEMATOMA OF GREAT TOE OF LEFT FOOT, INITIAL ENCOUNTER: ICD-10-CM

## 2024-04-09 DIAGNOSIS — S90.211A SUBUNGUAL HEMATOMA OF GREAT TOE OF RIGHT FOOT, INITIAL ENCOUNTER: ICD-10-CM

## 2024-04-09 PROCEDURE — 99212 OFFICE O/P EST SF 10 MIN: CPT

## 2024-04-09 PROCEDURE — 99213 OFFICE O/P EST LOW 20 MIN: CPT

## 2024-04-09 ASSESSMENT — PAIN SCALES - GENERAL: PAINLEVEL_OUTOF10: 0

## 2024-04-09 ASSESSMENT — PAIN - FUNCTIONAL ASSESSMENT: PAIN_FUNCTIONAL_ASSESSMENT: 0-10

## 2024-04-09 ASSESSMENT — ENCOUNTER SYMPTOMS: COLOR CHANGE: 1

## 2024-04-09 NOTE — DISCHARGE INSTRUCTIONS
Monitor for worsening symptoms  At this time it is hard to tell if it is blood under the nail or discoloration from nail polish.  If numbness, tingling or pain occurs follow up with PCP or podiatrist.

## 2024-04-09 NOTE — ED PROVIDER NOTES
[facility]  Urgent Care Encounter       CHIEF COMPLAINT       Chief Complaint   Patient presents with    toenail issue     Pt states she took off her toenail polish today and noticed bilateral big toes nails are purple       Nurses Notes reviewed and I agree except as noted in the HPI.  HISTORY OF PRESENT ILLNESS   Dione Lang is a 53 y.o. female who presents with complaints of bilateral big toenail issue. Pt reports that she had purple toenail polish on her big toes for a month and when she took it off today she noticed purple discoloration to toenails. Pt denies pain, numbness or tingling. Pt denies any injury. Pt concerned as she is a diabetic.     The history is provided by the patient.       REVIEW OF SYSTEMS     Review of Systems   Skin:  Positive for color change (nail bed).   All other systems reviewed and are negative.      PAST MEDICAL HISTORY         Diagnosis Date    CAD (coronary artery disease)     Depression     Diabetes mellitus (HCC)     Headache     Hyperlipidemia        SURGICALHISTORY     Patient  has a past surgical history that includes Coronary angioplasty with stent and Tubal ligation.    CURRENT MEDICATIONS       Discharge Medication List as of 4/9/2024  2:42 PM        CONTINUE these medications which have NOT CHANGED    Details   ibuprofen (ADVIL;MOTRIN) 600 MG tablet Take 1 tablet by mouth every 6 hours as needed for Pain, Disp-40 tablet, R-1Normal      insulin detemir (LEVEMIR FLEXTOUCH) 100 UNIT/ML injection pen INJECT 18 UNITS SUBCUTANEOUSLY NIGHTLY, Disp-30 mL, R-0Normal      ketorolac (TORADOL) 10 MG tablet Take 1 tablet by mouth every 8 hours as needed for Pain, Disp-15 tablet, R-0Normal      metFORMIN (GLUCOPHAGE) 1000 MG tablet TAKE 1 TABLET TWICE DAILY WITH MEALS, Disp-60 tablet, R-3Normal      sertraline (ZOLOFT) 50 MG tablet Take 1 tablet by mouth dailyHistorical Med      Lancets MISC Disp-100 each,R-3, NormalUse 3 times daily to check blood sugar

## 2024-04-09 NOTE — ED TRIAGE NOTES
Pt states she took off her purple toenail polish today and noticed bilateral big toes nails are purple.  Pt denies pain.

## 2024-04-22 ENCOUNTER — HOSPITAL ENCOUNTER (OUTPATIENT)
Age: 54
Discharge: HOME | End: 2024-04-22
Payer: MEDICARE

## 2024-04-22 LAB
ALBUMIN SERPL BCG-MCNC: 3.9 G/DL (ref 3.5–5.1)
ALP SERPL-CCNC: 100 U/L (ref 38–126)
ALT SERPL W/O P-5'-P-CCNC: 12 U/L (ref 11–66)
ANION GAP SERPL CALC-SCNC: 12 MEQ/L (ref 8–16)
AST SERPL-CCNC: 19 U/L (ref 5–40)
BASOPHILS ABSOLUTE: 0 THOU/MM3 (ref 0–0.1)
BASOPHILS NFR BLD AUTO: 0.4 %
BILIRUB SERPL-MCNC: 0.5 MG/DL (ref 0.3–1.2)
BUN SERPL-MCNC: 10 MG/DL (ref 7–22)
CALCIUM SERPL-MCNC: 8.8 MG/DL (ref 8.5–10.5)
CHLORIDE SERPL-SCNC: 104 MEQ/L (ref 98–111)
CHOLEST SERPL-MCNC: 200 MG/DL (ref 100–199)
CO2 SERPL-SCNC: 25 MEQ/L (ref 23–33)
CREAT SERPL-MCNC: 0.5 MG/DL (ref 0.4–1.2)
DEPRECATED RDW RBC AUTO: 41.7 FL (ref 35–45)
EOSINOPHIL NFR BLD AUTO: 5.8 %
EOSINOPHILS ABSOLUTE: 0.3 THOU/MM3 (ref 0–0.4)
ERYTHROCYTE [DISTWIDTH] IN BLOOD BY AUTOMATED COUNT: 14.3 % (ref 11.5–14.5)
GFR SERPL CREATININE-BSD FRML MDRD: > 90 ML/MIN/1.73M2
GLUCOSE SERPL-MCNC: 100 MG/DL (ref 70–108)
HCT VFR BLD AUTO: 39.7 % (ref 37–47)
HDLC SERPL-MCNC: 69 MG/DL
HGB BLD-MCNC: 12 GM/DL (ref 12–16)
IMM GRANULOCYTES # BLD AUTO: 0.01 THOU/MM3 (ref 0–0.07)
IMM GRANULOCYTES NFR BLD AUTO: 0.2 %
LDLC SERPL CALC-MCNC: 117 MG/DL
LYMPHOCYTES ABSOLUTE: 1.7 THOU/MM3 (ref 1–4.8)
LYMPHOCYTES NFR BLD AUTO: 36.3 %
MCH RBC QN AUTO: 24.7 PG (ref 26–33)
MCHC RBC AUTO-ENTMCNC: 30.2 GM/DL (ref 32.2–35.5)
MCV RBC AUTO: 81.9 FL (ref 81–99)
MONOCYTES ABSOLUTE: 0.3 THOU/MM3 (ref 0.4–1.3)
MONOCYTES NFR BLD AUTO: 7.3 %
NEUTROPHILS NFR BLD AUTO: 50 %
NRBC BLD AUTO-RTO: 0 /100 WBC
PLATELET # BLD AUTO: 264 THOU/MM3 (ref 130–400)
PMV BLD AUTO: 12 FL (ref 9.4–12.4)
POTASSIUM SERPL-SCNC: 4.1 MEQ/L (ref 3.5–5.2)
PROT SERPL-MCNC: 7.4 G/DL (ref 6.1–8)
RBC # BLD AUTO: 4.85 MILL/MM3 (ref 4.2–5.4)
SEGMENTED NEUTROPHILS ABSOLUTE COUNT: 2.4 THOU/MM3 (ref 1.8–7.7)
SODIUM SERPL-SCNC: 141 MEQ/L (ref 135–145)
TRIGL SERPL-MCNC: 69 MG/DL (ref 0–199)
TSH SERPL DL<=0.005 MIU/L-ACNC: 2.65 UIU/ML (ref 0.4–4.2)
WBC # BLD AUTO: 4.7 THOU/MM3 (ref 4.8–10.8)

## 2024-04-22 PROCEDURE — 36415 COLL VENOUS BLD VENIPUNCTURE: CPT

## 2024-04-22 PROCEDURE — 80061 LIPID PANEL: CPT

## 2024-04-22 PROCEDURE — 84443 ASSAY THYROID STIM HORMONE: CPT

## 2024-04-22 PROCEDURE — 80053 COMPREHEN METABOLIC PANEL: CPT

## 2024-04-22 PROCEDURE — 85025 COMPLETE CBC W/AUTO DIFF WBC: CPT

## 2024-05-06 ENCOUNTER — HOSPITAL ENCOUNTER (OUTPATIENT)
Dept: WOMENS IMAGING | Age: 54
Discharge: HOME | End: 2024-05-06
Attending: RADIOLOGY

## 2024-05-06 DIAGNOSIS — Z00.6 ENCOUNTER FOR EXAMINATION FOR NORMAL COMPARISON OR CONTROL IN CLINICAL RESEARCH PROGRAM: ICD-10-CM

## 2024-05-08 ENCOUNTER — HOSPITAL ENCOUNTER (OUTPATIENT)
Dept: MAMMOGRAPHY | Age: 54
Discharge: HOME | End: 2024-05-08
Payer: MEDICARE

## 2024-05-08 ENCOUNTER — HOSPITAL ENCOUNTER (OUTPATIENT)
Dept: WOMENS IMAGING | Age: 54
Discharge: HOME | End: 2024-05-08
Attending: RADIOLOGY
Payer: MEDICARE

## 2024-05-08 VITALS — HEIGHT: 62 IN | BODY MASS INDEX: 30.35 KG/M2 | WEIGHT: 164.9 LBS

## 2024-05-08 DIAGNOSIS — Z12.31 ENCOUNTER FOR SCREENING MAMMOGRAM FOR BREAST CANCER: ICD-10-CM

## 2024-05-08 DIAGNOSIS — Z00.6 ENCOUNTER FOR EXAMINATION FOR NORMAL COMPARISON OR CONTROL IN CLINICAL RESEARCH PROGRAM: ICD-10-CM

## 2024-05-08 PROCEDURE — 77063 BREAST TOMOSYNTHESIS BI: CPT

## 2025-06-18 ENCOUNTER — TRANSCRIBE ORDERS (OUTPATIENT)
Dept: ADMINISTRATIVE | Age: 55
End: 2025-06-18

## 2025-06-18 ENCOUNTER — HOSPITAL ENCOUNTER (OUTPATIENT)
Dept: MAMMOGRAPHY | Age: 55
Discharge: HOME | End: 2025-06-18
Payer: MEDICARE

## 2025-06-18 DIAGNOSIS — Z87.39 HISTORY OF OSTEOPENIA: ICD-10-CM

## 2025-06-18 DIAGNOSIS — Z12.39 SCREENING BREAST EXAMINATION: ICD-10-CM

## 2025-06-18 DIAGNOSIS — M85.852 OTHER SPECIFIED DISORDERS OF BONE DENSITY AND STRUCTURE, LEFT THIGH: Primary | ICD-10-CM

## 2025-06-18 DIAGNOSIS — M85.851 OTHER SPECIFIED DISORDERS OF BONE DENSITY AND STRUCTURE, RIGHT THIGH: ICD-10-CM

## 2025-06-18 PROCEDURE — 77063 BREAST TOMOSYNTHESIS BI: CPT

## 2025-06-27 ENCOUNTER — HOSPITAL ENCOUNTER (OUTPATIENT)
Dept: WOMENS IMAGING | Age: 55
Discharge: HOME | End: 2025-06-27
Payer: MEDICARE

## 2025-06-27 VITALS — BODY MASS INDEX: 26.41 KG/M2 | HEIGHT: 62 IN | WEIGHT: 143.5 LBS

## 2025-06-27 DIAGNOSIS — M85.852 OTHER SPECIFIED DISORDERS OF BONE DENSITY AND STRUCTURE, LEFT THIGH: ICD-10-CM

## 2025-06-27 DIAGNOSIS — Z87.39 HISTORY OF OSTEOPENIA: ICD-10-CM

## 2025-06-27 DIAGNOSIS — M85.851 OTHER SPECIFIED DISORDERS OF BONE DENSITY AND STRUCTURE, RIGHT THIGH: ICD-10-CM

## 2025-06-27 PROCEDURE — 77080 DXA BONE DENSITY AXIAL: CPT
